# Patient Record
Sex: MALE | Race: BLACK OR AFRICAN AMERICAN | NOT HISPANIC OR LATINO | URBAN - METROPOLITAN AREA
[De-identification: names, ages, dates, MRNs, and addresses within clinical notes are randomized per-mention and may not be internally consistent; named-entity substitution may affect disease eponyms.]

---

## 2024-11-19 ENCOUNTER — TELEPHONE (OUTPATIENT)
Dept: GASTROENTEROLOGY | Facility: CLINIC | Age: 69
End: 2024-11-19
Payer: MEDICARE

## 2024-11-19 NOTE — TELEPHONE ENCOUNTER
Left message for patient to call office to schedule colonoscopy no office needed No active Portal no message sent.

## 2024-11-19 NOTE — TELEPHONE ENCOUNTER
----- Message from Kimberlee Boss NP sent at 11/19/2024 12:07 PM CST -----  Regarding: positive cologuard  If he wants to just schedule his colonoscopy - I don't need to see him

## 2024-11-19 NOTE — TELEPHONE ENCOUNTER
----- Message from Celia sent at 11/19/2024  3:36 PM CST -----  Contact: self  Type:  Patient Returning Call    Who Called:self  Who Left Message for Patient:rhonda   Does the patient know what this is regarding?:yes  Would the patient rather a call back or a response via [facility]? call  Best Call Back Number:.[phone]    Additional Information: please advise and thank you.

## 2024-11-20 ENCOUNTER — TELEPHONE (OUTPATIENT)
Dept: GASTROENTEROLOGY | Facility: CLINIC | Age: 69
End: 2024-11-20
Payer: MEDICARE

## 2024-11-20 DIAGNOSIS — Z12.11 SCREENING FOR COLON CANCER: ICD-10-CM

## 2024-11-20 DIAGNOSIS — R19.5 POSITIVE COLORECTAL CANCER SCREENING USING COLOGUARD TEST: Primary | ICD-10-CM

## 2024-11-20 NOTE — TELEPHONE ENCOUNTER
Colonoscopy 12/23 instructions reviewed and patient states understanding. Patient will come by the office today and  a copy of prep instructions. Patient states he had a +cologuard that was sent by his insurance and he is not on any medications.

## 2024-12-23 ENCOUNTER — HOSPITAL ENCOUNTER (OUTPATIENT)
Facility: HOSPITAL | Age: 69
Discharge: HOME | End: 2024-12-23
Attending: STUDENT IN AN ORGANIZED HEALTH CARE EDUCATION/TRAINING PROGRAM | Admitting: STUDENT IN AN ORGANIZED HEALTH CARE EDUCATION/TRAINING PROGRAM
Payer: MEDICARE

## 2024-12-23 ENCOUNTER — ANESTHESIA EVENT (OUTPATIENT)
Dept: ENDOSCOPY | Facility: HOSPITAL | Age: 69
End: 2024-12-23
Payer: MEDICARE

## 2024-12-23 ENCOUNTER — ANESTHESIA (OUTPATIENT)
Dept: ENDOSCOPY | Facility: HOSPITAL | Age: 69
End: 2024-12-23
Payer: MEDICARE

## 2024-12-23 DIAGNOSIS — R19.5 POSITIVE COLORECTAL CANCER SCREENING USING COLOGUARD TEST: Primary | ICD-10-CM

## 2024-12-23 PROCEDURE — 37000008 HC ANESTHESIA 1ST 15 MINUTES: Performed by: STUDENT IN AN ORGANIZED HEALTH CARE EDUCATION/TRAINING PROGRAM

## 2024-12-23 PROCEDURE — 88305 TISSUE EXAM BY PATHOLOGIST: CPT | Mod: TC | Performed by: PATHOLOGY

## 2024-12-23 PROCEDURE — D9220A PRA ANESTHESIA: Mod: PT,CRNA,, | Performed by: STUDENT IN AN ORGANIZED HEALTH CARE EDUCATION/TRAINING PROGRAM

## 2024-12-23 PROCEDURE — 45380 COLONOSCOPY AND BIOPSY: CPT | Mod: PT,KX,, | Performed by: STUDENT IN AN ORGANIZED HEALTH CARE EDUCATION/TRAINING PROGRAM

## 2024-12-23 PROCEDURE — 45380 COLONOSCOPY AND BIOPSY: CPT | Mod: PT,KX | Performed by: STUDENT IN AN ORGANIZED HEALTH CARE EDUCATION/TRAINING PROGRAM

## 2024-12-23 PROCEDURE — 27201012 HC FORCEPS, HOT/COLD, DISP: Performed by: STUDENT IN AN ORGANIZED HEALTH CARE EDUCATION/TRAINING PROGRAM

## 2024-12-23 PROCEDURE — 37000009 HC ANESTHESIA EA ADD 15 MINS: Performed by: STUDENT IN AN ORGANIZED HEALTH CARE EDUCATION/TRAINING PROGRAM

## 2024-12-23 PROCEDURE — 63600175 PHARM REV CODE 636 W HCPCS: Performed by: STUDENT IN AN ORGANIZED HEALTH CARE EDUCATION/TRAINING PROGRAM

## 2024-12-23 PROCEDURE — D9220A PRA ANESTHESIA: Mod: PT,ANES,, | Performed by: ANESTHESIOLOGY

## 2024-12-23 PROCEDURE — 25000003 PHARM REV CODE 250: Performed by: STUDENT IN AN ORGANIZED HEALTH CARE EDUCATION/TRAINING PROGRAM

## 2024-12-23 RX ORDER — LIDOCAINE HYDROCHLORIDE 20 MG/ML
INJECTION INTRAVENOUS
Status: DISCONTINUED | OUTPATIENT
Start: 2024-12-23 | End: 2024-12-23

## 2024-12-23 RX ORDER — SODIUM CHLORIDE 9 MG/ML
INJECTION, SOLUTION INTRAVENOUS CONTINUOUS
Status: DISCONTINUED | OUTPATIENT
Start: 2024-12-23 | End: 2024-12-23 | Stop reason: HOSPADM

## 2024-12-23 RX ORDER — PROPOFOL 10 MG/ML
VIAL (ML) INTRAVENOUS
Status: DISCONTINUED | OUTPATIENT
Start: 2024-12-23 | End: 2024-12-23

## 2024-12-23 RX ADMIN — PROPOFOL 50 MG: 10 INJECTION, EMULSION INTRAVENOUS at 12:12

## 2024-12-23 RX ADMIN — PROPOFOL 20 MG: 10 INJECTION, EMULSION INTRAVENOUS at 12:12

## 2024-12-23 RX ADMIN — PROPOFOL 20 MG: 10 INJECTION, EMULSION INTRAVENOUS at 11:12

## 2024-12-23 RX ADMIN — LIDOCAINE HYDROCHLORIDE 80 MG: 20 INJECTION, SOLUTION INTRAVENOUS at 11:12

## 2024-12-23 RX ADMIN — PROPOFOL 100 MG: 10 INJECTION, EMULSION INTRAVENOUS at 11:12

## 2024-12-23 RX ADMIN — GLYCOPYRROLATE 0.1 MG: 0.2 INJECTION, SOLUTION INTRAMUSCULAR; INTRAVITREAL at 11:12

## 2024-12-23 RX ADMIN — SODIUM CHLORIDE: 9 INJECTION, SOLUTION INTRAVENOUS at 10:12

## 2024-12-23 RX ADMIN — PROPOFOL 30 MG: 10 INJECTION, EMULSION INTRAVENOUS at 11:12

## 2024-12-23 RX ADMIN — PROPOFOL 50 MG: 10 INJECTION, EMULSION INTRAVENOUS at 11:12

## 2024-12-23 NOTE — ANESTHESIA PREPROCEDURE EVALUATION
12/23/2024  Ju Barraza is a 69 y.o., male.      Pre-op Assessment    I have reviewed the NPO Status.   I have reviewed the Medications.     Review of Systems  Anesthesia Hx:  No problems with previous Anesthesia                Cardiovascular:  Exercise tolerance: good                                             Pulmonary:  Pulmonary Normal                       Hepatic/GI:  Bowel Prep.                       Physical Exam  General: Well nourished    Airway:  Mallampati: II   Mouth Opening: Normal  TM Distance: Normal  Tongue: Normal  Neck ROM: Normal ROM    Dental:  Intact    Chest/Lungs:  Clear to auscultation, Normal Respiratory Rate        Anesthesia Plan  Type of Anesthesia, risks & benefits discussed:    Anesthesia Type: Gen Natural Airway  Intra-op Monitoring Plan: Standard ASA Monitors  Induction:  IV  Informed Consent: Informed consent signed with the Patient and all parties understand the risks and agree with anesthesia plan.  All questions answered.   ASA Score: 2    Ready For Surgery From Anesthesia Perspective.     .

## 2024-12-23 NOTE — PLAN OF CARE
Vss, rome po fluids, denies pain, ambulates easily. IV removed, catheter intact. Discharge instructions provided and states understanding. States ready to go home.  Discharged from facility with family per wheelchair.

## 2024-12-23 NOTE — PROVATION PATIENT INSTRUCTIONS
Discharge Summary/Instructions after an Endoscopic Procedure  Patient Name: Ju Barraza  Patient MRN: [id number]  Patient YOB: 1955 Monday, December 23, 2024  Loco Orr DO  Dear patient,  As a result of recent federal legislation (The Federal Cures Act), you may   receive lab or pathology results from your procedure in your [facility]   account before your physician is able to contact you. Your physician or   their representative will relay the results to you with their   recommendations at their soonest availability.  Thank you,  RESTRICTIONS:  During your procedure today, you received medications for sedation.  These   medications may affect your judgment, balance and coordination.  Therefore,   for 24 hours, you have the following restrictions:   - DO NOT drive a car, operate machinery, make legal/financial decisions,   sign important papers or drink alcohol.    ACTIVITY:  Today: no heavy lifting, straining or running due to procedural   sedation/anesthesia.  The following day: return to full activity including work.  DIET:  Eat and drink normally unless instructed otherwise.     TREATMENT FOR COMMON SIDE EFFECTS:  - Mild abdominal pain, nausea, belching, bloating or excessive gas:  rest,   eat lightly and use a heating pad.  - Sore Throat: treat with throat lozenges and/or gargle with warm salt   water.  - Because air was used during the procedure, expelling large amounts of air   from your rectum or belching is normal.  - If a bowel prep was taken, you may not have a bowel movement for 1-3 days.    This is normal.  SYMPTOMS TO WATCH FOR AND REPORT TO YOUR PHYSICIAN:  1. Abdominal pain or bloating, other than gas cramps.  2. Chest pain.  3. Back pain.  4. Signs of infection such as: chills or fever occurring within 24 hours   after the procedure.  5. Rectal bleeding, which would show as bright red, maroon, or black stools.   (A tablespoon of blood from the rectum is not serious,  especially if   hemorrhoids are present.)  6. Vomiting.  7. Weakness or dizziness.  GO DIRECTLY TO THE NEAREST EMERGENCY ROOM IF YOU HAVE ANY OF THE FOLLOWING:      Difficulty breathing              Chills and/or fever over 101 F   Persistent vomiting and/or vomiting blood   Severe abdominal pain   Severe chest pain   Black, tarry stools   Bleeding- more than one tablespoon   Any other symptom or condition that you feel may need urgent attention  Your doctor recommends these additional instructions:  If any biopsies were taken, your doctors clinic will contact you in 1 to 2   weeks with any results.  - Patient has a contact number available for emergencies.  The signs and   symptoms of potential delayed complications were discussed with the   patient.  Return to normal activities tomorrow.  Written discharge   instructions were provided to the patient.   - High fiber diet.   - Continue present medications.   - Await pathology results.   - Plan for repeat colonoscopy in 1 year from surgery date.   - Return to GI clinic in 1 year or sooner if needed.   - Refer to a colo-rectal surgeon at appointment to be scheduled.   - Discharge patient to home (ambulatory).  For questions, problems or results please call your physician - Loco Orr DO at Work:  [phone].  [facility], EMERGENCY ROOM PHONE NUMBER: [phone]  IF A COMPLICATION OR EMERGENCY SITUATION ARISES AND YOU ARE UNABLE TO REACH   YOUR PHYSICIAN - GO DIRECTLY TO THE EMERGENCY ROOM.  Loco Orr DO  12/23/2024 12:36:01 PM  This report has been verified and signed electronically.  Dear patient,  As a result of recent federal legislation (The Federal Cures Act), you may   receive lab or pathology results from your procedure in your [facility]   account before your physician is able to contact you. Your physician or   their representative will relay the results to you with their   recommendations at their soonest availability.  Thank  you,  PROVATION

## 2024-12-23 NOTE — H&P
History & Physical - Short Stay  Gastroenterology      SUBJECTIVE:     Procedure: Colonoscopy    Chief Complaint/Indication for Procedure: +Cologuard    No medications prior to admission.       Review of patient's allergies indicates:  No Known Allergies     History reviewed. No pertinent past medical history.  History reviewed. No pertinent surgical history.  No family history on file.  Social History     Tobacco Use    Smoking status: Never    Smokeless tobacco: Never   Substance Use Topics    Alcohol use: Never    Drug use: Never         OBJECTIVE:     Vital Signs (Most Recent)  Temp: 97.7 °F (36.5 °C) (12/23/24 0945)  Pulse: 94 (12/23/24 0945)  Resp: 20 (12/23/24 0945)  BP: (!) 157/85 (12/23/24 0945)  SpO2: 96 % (12/23/24 0945)    Physical Exam:                                                       GENERAL:  Comfortable, in no acute distress.                                 HEENT EXAM:  Nonicteric.  No adenopathy.  Oropharynx is clear.               NECK:  Supple.                                                               LUNGS:  Clear.                                                               CARDIAC:  Regular rate and rhythm.  S1, S2.  No murmur.                      ABDOMEN:  Soft, positive bowel sounds, nontender.  No hepatosplenomegaly or masses.  No rebound or guarding.                                             EXTREMITIES:  No edema.     MENTAL STATUS:  Normal, alert and oriented.      ASSESSMENT/PLAN:     Assessment: +Cologuard    Plan: Colonoscopy    Anesthesia Plan: MAC    ASA Grade: ASA 2 - Patient with mild systemic disease with no functional limitations    MALLAMPATI SCORE:  I (soft palate, uvula, fauces, and tonsillar pillars visible)

## 2024-12-23 NOTE — TRANSFER OF CARE
"Anesthesia Transfer of Care Note    Patient: Ju Barraza    Procedure(s) Performed: Procedure(s) (LRB):  COLONOSCOPY, SCREENING, HIGH RISK PATIENT (N/A)    Patient location: GI    Anesthesia Type: general    Transport from OR: Transported from OR on room air with adequate spontaneous ventilation    Post pain: adequate analgesia    Post assessment: no apparent anesthetic complications    Post vital signs: stable    Level of consciousness: lethargic and responds to stimulation    Nausea/Vomiting: no nausea/vomiting    Complications: none    Transfer of care protocol was followed      Last vitals: Visit Vitals  BP (!) 157/85 (BP Location: Left arm, Patient Position: Lying)   Pulse 94   Temp 36.5 °C (97.7 °F) (Skin)   Resp 20   Ht 5' 8.5" (1.74 m)   Wt 93 kg (205 lb)   SpO2 96%   BMI 30.72 kg/m²     " Addended by: ANDRE RAGSDALE on: 10/25/2021 01:17 PM     Modules accepted: Orders

## 2024-12-24 ENCOUNTER — TELEPHONE (OUTPATIENT)
Dept: SURGERY | Facility: CLINIC | Age: 69
End: 2024-12-24
Payer: MEDICARE

## 2024-12-24 VITALS
OXYGEN SATURATION: 83 % | HEART RATE: 80 BPM | HEIGHT: 69 IN | BODY MASS INDEX: 30.36 KG/M2 | SYSTOLIC BLOOD PRESSURE: 137 MMHG | RESPIRATION RATE: 18 BRPM | TEMPERATURE: 98 F | DIASTOLIC BLOOD PRESSURE: 84 MMHG | WEIGHT: 205 LBS

## 2024-12-24 DIAGNOSIS — R19.5 POSITIVE COLORECTAL CANCER SCREENING USING COLOGUARD TEST: Primary | ICD-10-CM

## 2024-12-24 DIAGNOSIS — K63.89 COLONIC MASS: Primary | ICD-10-CM

## 2024-12-26 ENCOUNTER — TELEPHONE (OUTPATIENT)
Dept: SURGERY | Facility: CLINIC | Age: 69
End: 2024-12-26
Payer: MEDICARE

## 2024-12-26 NOTE — TELEPHONE ENCOUNTER
----- Message from Nurse Pierre sent at 12/24/2024 12:30 PM CST -----    ----- Message -----  From: Domi Brian  Sent: 12/24/2024  12:20 PM CST  To: Dominga Adan Staff    Type:  Patient Returning Call    Who Called:  Ju  Who Left Message for Patient:  not sure  Does the patient know what this is regarding?:  no  Best Call Back Number:  [phone]   Additional Information:  PT rtn a call back but not sure who called

## 2024-12-26 NOTE — ANESTHESIA POSTPROCEDURE EVALUATION
Anesthesia Post Evaluation    Patient: Ju Barraza    Procedure(s) Performed: Procedure(s) (LRB):  COLONOSCOPY, SCREENING, HIGH RISK PATIENT (N/A)    Final Anesthesia Type: general      Patient location during evaluation: PACU  Patient participation: Yes- Able to Participate  Level of consciousness: awake and alert and oriented  Post-procedure vital signs: reviewed and stable  Pain management: adequate  Airway patency: patent    PONV status at discharge: No PONV  Anesthetic complications: no      Cardiovascular status: blood pressure returned to baseline  Respiratory status: unassisted, spontaneous ventilation and room air  Hydration status: euvolemic  Follow-up not needed.              Vitals Value Taken Time   /84 12/23/24 1245   Temp 36.4 °C (97.5 °F) 12/23/24 1245   Pulse 80 12/23/24 1245   Resp 18 12/23/24 1245   SpO2 83 % 12/23/24 1245         Event Time   Out of Recovery 12:59:34         Pain/Liban Score: No data recorded

## 2024-12-30 ENCOUNTER — HOSPITAL ENCOUNTER (OUTPATIENT)
Dept: RADIOLOGY | Facility: HOSPITAL | Age: 69
Discharge: HOME | End: 2024-12-30
Attending: STUDENT IN AN ORGANIZED HEALTH CARE EDUCATION/TRAINING PROGRAM
Payer: MEDICARE

## 2024-12-30 DIAGNOSIS — K63.89 COLONIC MASS: ICD-10-CM

## 2024-12-30 PROCEDURE — 25500020 PHARM REV CODE 255

## 2024-12-30 PROCEDURE — 71260 CT THORAX DX C+: CPT | Mod: 26,,, | Performed by: RADIOLOGY

## 2024-12-30 PROCEDURE — 74177 CT ABD & PELVIS W/CONTRAST: CPT | Mod: 26,,, | Performed by: RADIOLOGY

## 2024-12-30 PROCEDURE — 71260 CT THORAX DX C+: CPT | Mod: TC

## 2024-12-30 RX ADMIN — IOHEXOL 100 ML: 350 INJECTION, SOLUTION INTRAVENOUS at 01:12

## 2025-01-02 DIAGNOSIS — K63.89 COLONIC MASS: Primary | ICD-10-CM

## 2025-01-03 ENCOUNTER — TELEPHONE (OUTPATIENT)
Dept: HEMATOLOGY/ONCOLOGY | Facility: CLINIC | Age: 70
End: 2025-01-03
Payer: MEDICARE

## 2025-01-03 NOTE — TELEPHONE ENCOUNTER
----- Message from Appetas sent at 1/3/2025 12:25 PM CST -----  Type: Needs Medical Advice  Who Called:  Ju   Best Call Back Number: [phone]    Additional Information: Ju  has a referral for hematology and oncology and is ready to schedule , please call to further discuss thank you .

## 2025-01-03 NOTE — NURSING
Spoke with patient, wants to schedule with [facility] from referral.  Explained that he is scheduled with Dr. Fuentes for a bx and once we get the pathology back we will schedule. We can not schedule until we know what type of cancer he has. Pt verbalized agreement. Reminder set to watch for Path results.

## 2025-01-06 ENCOUNTER — TELEPHONE (OUTPATIENT)
Dept: SURGERY | Facility: CLINIC | Age: 70
End: 2025-01-06
Payer: MEDICARE

## 2025-01-06 ENCOUNTER — TELEPHONE (OUTPATIENT)
Dept: BARIATRICS | Facility: CLINIC | Age: 70
End: 2025-01-06
Payer: MEDICARE

## 2025-01-06 NOTE — TELEPHONE ENCOUNTER
Patient was rescheduled from 1:30pm tomorrow to 3:45pm with Dr. Fuentes in [place] by CURTIS Nicole

## 2025-01-07 ENCOUNTER — OFFICE VISIT (OUTPATIENT)
Dept: SURGERY | Facility: CLINIC | Age: 70
End: 2025-01-07
Payer: MEDICARE

## 2025-01-07 VITALS
SYSTOLIC BLOOD PRESSURE: 159 MMHG | BODY MASS INDEX: 32.26 KG/M2 | RESPIRATION RATE: 16 BRPM | WEIGHT: 217.81 LBS | HEIGHT: 69 IN | TEMPERATURE: 99 F | DIASTOLIC BLOOD PRESSURE: 77 MMHG | HEART RATE: 97 BPM

## 2025-01-07 DIAGNOSIS — K63.89 COLONIC MASS: Primary | ICD-10-CM

## 2025-01-07 DIAGNOSIS — R19.5 POSITIVE COLORECTAL CANCER SCREENING USING COLOGUARD TEST: ICD-10-CM

## 2025-01-07 DIAGNOSIS — Z01.818 PREOP EXAMINATION: ICD-10-CM

## 2025-01-07 DIAGNOSIS — R94.8 ABNORMAL RESULTS OF FUNCTION STUDIES OF OTHER ORGANS AND SYSTEMS: ICD-10-CM

## 2025-01-07 DIAGNOSIS — D37.4 NEOPLASM OF UNCERTAIN BEHAVIOR OF COLON: ICD-10-CM

## 2025-01-07 PROCEDURE — 99999 PR PBB SHADOW E&M-EST. PATIENT-LVL IV: CPT | Mod: PBBFAC,,, | Performed by: SURGERY

## 2025-01-07 PROCEDURE — 99214 OFFICE O/P EST MOD 30 MIN: CPT | Mod: PBBFAC,PN | Performed by: SURGERY

## 2025-01-07 PROCEDURE — 99205 OFFICE O/P NEW HI 60 MIN: CPT | Mod: S$PBB,,, | Performed by: SURGERY

## 2025-01-07 NOTE — PATIENT INSTRUCTIONS
Surgery is scheduled for 1/17/25 arrival time will be given by the the preop nurse.  You may receive two calls from the Preop Nurses one a few days before surgery the second the day before surgery with the arrival time.  The preop nurse will call you from [phone]  Nothing to eat or drink after midnight.  Someone to drive you home if you are same day surgery.    THE PREOP NURSE WILL CALL, SOMETIMES AS LATE AS 4 or 5 PM IN THE AFTERNOON THE DAY BEFORE SURGERY.    Shower the night before and the morning of your procedure with Chlorhexidine Surgical Scrub also known as Hibiclens it can be purchased at most Pharmacy's no prescription needed.    Special Instruction:     Your procedure/surgery is scheduled at the [facility] at [address] [place].     Bowel Prep for Colonoscopy/Surgery  Purchase:  Dulcolax Pills (Laxative) 4 tablets  14 dose bottle of Miralax Powder  64 ounces of Gatorade or Powerade    The day before your procedure no solid foods, clear liquids only to include water, Gatorade,Powerade.  Coffee no creamer  Soft Drinks  Popsicles   Jello   Sachin Aid  Chicken or beef broth  Apple juice, white grape juice, Tea,   Hard Candy    NO RED OR PURPLE COLORED LIQUIDS, JELLO, POPSCILES.  Try to Avoid these foods 7 days before your Procedure:  beans, peas, corn, nuts, popcorn and tomatoes.  Try not to use Advil or Ibuprofen, Non-Steroidal Anti-inflammatories such as Aleve for 7 days before your colonoscopy, no fish oil for 5 days before the procedure.    Mix the entire 14 dose bottle of Miralax into  64 ounces of Gatorade into a large pitcher, stir and chill until ready to use.  The day before procedure starting at noon take all 4 Dulcolax tablets followed by clear liquids until 2pm.  Keep in mind to drink plenty of fluids this is how the laxatives work, plenty fluids.  After 2pm no later than 4pm start drinkg the Miralax/Gatorade mix 8  ounces at a time over a 2-4 hour period until completing the entire 64 ounces, one glass every 15 to 30 minutes.  Keep sipping clear liquids between each dose  Contact your Diabetes doctor or Endocrinologist for detailed instruction regarding your insulin or oral diabetes medication.     Contact Dr. Fuentes if you have any questions, [phone]    Be Sure to Notify Your Doctor if You are on a Prescription Blood Thinner.    Contact Donato Toney LPN for questions are concerns. [phone]

## 2025-01-08 NOTE — PROGRESS NOTES
Subjective     Patient ID: Ju Barraza is a 69 y.o. male.    Chief Complaint: Consult (Cecal mass)    HPI  this is a pleasant 69 year gentleman who was referred to me for evaluation following recent colonoscopy.  Patient had recent colonoscopy given positive Cologuard test.  At colonoscopy he was found to have a large lesion in the cecum.  Mass was too large to be endoscopically removed.  It certainly had adenomatous features with concern for potential malignancy.  Biopsies were performed demonstrating fragments of tubular adenoma with high-grade dysplasia.  Patient later had a CT scan of the chest abdomen and pelvis demonstrating mass within the cecum.  Incidentally there were also 2 enlarged nodes along the right external iliac chain.  One measured 3.7 x 3 x 3.9 cm in the other measured 1.3 x 1.1 x 1 cm in was adjacent to the bladder on the right side of note his prostate gland was enlarged in conjunction with the enlarged retroperitoneal lymph nodes.  He has no significant medical or surgical history  Review of Systems   Constitutional:  Negative for activity change and appetite change.   Gastrointestinal:  Negative for abdominal distention, abdominal pain and anal bleeding.          Objective     Physical Exam  Vitals reviewed.   Cardiovascular:      Rate and Rhythm: Normal rate.      Pulses: Normal pulses.   Pulmonary:      Effort: Pulmonary effort is normal.   Abdominal:      General: Abdomen is flat.   Skin:     General: Skin is warm.   Neurological:      General: No focal deficit present.      Mental Status: He is alert.   Psychiatric:         Mood and Affect: Mood normal.       Please refer to endoscopy report from 12/23 for picks of the lesion.      Pathology:    CECUM, MASS, BIOPSY:     --FRAGMENTS OF TUBULAR ADENOMA WITH HIGH GRADE DYSPLASIA._______________________________________________________________________________________________________       CT chest/abd/pelvis reviewed.  Discussed in  HPI       Assessment and Plan     1. Positive colorectal cancer screening using Cologuard test  -     Ambulatory referral/consult to Colorectal Surgery ([place])        Lengthy discussion with the patient and his family.  Patient does have malignant appearing colon mass in the cecum.  This will certainly require a right hemicolectomy.  Incidentally found to have 2 large retroperitoneal lymph nodes at the time of CT scan.  This may be metastatic disease or certainly given the enlarged prostate seen on CT scan may be related to the prostate.  We will check a PSA.  If the PSA is normal would plan on excising these nodes robotically at the time of surgery.  If PSA is abnormal may require urologic workup prior to proceeding with surgical intervention.  We will check CEA and PSA level in the next 24-48 hours and make further recommendations based on these results.         No follow-ups on file.

## 2025-01-09 RX ORDER — METRONIDAZOLE 500 MG/100ML
500 INJECTION, SOLUTION INTRAVENOUS
OUTPATIENT
Start: 2025-01-09

## 2025-01-09 RX ORDER — SODIUM CHLORIDE 9 MG/ML
INJECTION, SOLUTION INTRAVENOUS CONTINUOUS
OUTPATIENT
Start: 2025-01-09

## 2025-01-10 ENCOUNTER — TELEPHONE (OUTPATIENT)
Dept: SURGERY | Facility: CLINIC | Age: 70
End: 2025-01-10
Payer: MEDICARE

## 2025-01-10 NOTE — TELEPHONE ENCOUNTER
I called patient to inform him that Donato has gone for the day.  I do not see anything about an antibiotic prescribed.  He asked about the Pre-admit appointment on Tuesday, 1/14/25 @ 8:15am @ [facility].  I informed him that they'll give him the prep and he'll have lab drawn.  Patient is having surgery on Friday, 1/17/25.  I'll forward the message to Donato for Monday.  Parviz

## 2025-01-10 NOTE — TELEPHONE ENCOUNTER
----- Message from Ellie sent at 1/10/2025  2:42 PM CST -----  Type:  Needs Medical Advice    Who Called: pt    Pharmacy name and phone #:    [id number] - [place] [id number] AT [address] [facility]  [address] [address]  Phone: [phone] Fax: [phone]       Would the patient rather a call back or a response via [facility]? Call back/ portal    Best Call Back Number: [phone]    Additional Information: Pt was under the impression that he was supposed to start antibiotics before his surgery a staff member, maybe Donato was going over the procedure and asked him what pharmacy etc he used. Pt just left [facility] and he doesn't have an Rx there for antibiotics. Please call to verify.  Please call back to advise. Thanks!

## 2025-01-13 DIAGNOSIS — C18.9 MALIGNANT NEOPLASM OF COLON, UNSPECIFIED PART OF COLON: Primary | ICD-10-CM

## 2025-01-13 RX ORDER — NEOMYCIN SULFATE 500 MG/1
TABLET ORAL
Qty: 6 TABLET | Refills: 0 | Status: SHIPPED | OUTPATIENT
Start: 2025-01-13

## 2025-01-14 ENCOUNTER — HOSPITAL ENCOUNTER (OUTPATIENT)
Dept: PREADMISSION TESTING | Facility: HOSPITAL | Age: 70
Discharge: HOME | End: 2025-01-14
Attending: SURGERY
Payer: MEDICARE

## 2025-01-14 VITALS — HEIGHT: 69 IN | WEIGHT: 217 LBS | BODY MASS INDEX: 32.14 KG/M2

## 2025-01-14 DIAGNOSIS — Z01.818 PREOP EXAMINATION: ICD-10-CM

## 2025-01-14 DIAGNOSIS — R19.5 POSITIVE COLORECTAL CANCER SCREENING USING COLOGUARD TEST: ICD-10-CM

## 2025-01-14 LAB
ABO + RH BLD: NORMAL
ALBUMIN SERPL BCP-MCNC: 3.5 G/DL (ref 3.5–5.2)
ALP SERPL-CCNC: 66 U/L (ref 40–150)
ALT SERPL W/O P-5'-P-CCNC: 22 U/L (ref 10–44)
ANION GAP SERPL CALC-SCNC: 12 MMOL/L (ref 8–16)
AST SERPL-CCNC: 14 U/L (ref 10–40)
BASOPHILS # BLD AUTO: 0.04 K/UL (ref 0–0.2)
BASOPHILS NFR BLD: 0.7 % (ref 0–1.9)
BILIRUB SERPL-MCNC: 0.3 MG/DL (ref 0.1–1)
BLD GP AB SCN CELLS X3 SERPL QL: NORMAL
BUN SERPL-MCNC: 16 MG/DL (ref 8–23)
CALCIUM SERPL-MCNC: 8.6 MG/DL (ref 8.7–10.5)
CHLORIDE SERPL-SCNC: 108 MMOL/L (ref 95–110)
CO2 SERPL-SCNC: 22 MMOL/L (ref 23–29)
CREAT SERPL-MCNC: 0.9 MG/DL (ref 0.5–1.4)
DIFFERENTIAL METHOD BLD: ABNORMAL
EOSINOPHIL # BLD AUTO: 0.2 K/UL (ref 0–0.5)
EOSINOPHIL NFR BLD: 2.5 % (ref 0–8)
ERYTHROCYTE [DISTWIDTH] IN BLOOD BY AUTOMATED COUNT: 16.3 % (ref 11.5–14.5)
EST. GFR  (NO RACE VARIABLE): >60 ML/MIN/1.73 M^2
GLUCOSE SERPL-MCNC: 133 MG/DL (ref 70–110)
HCT VFR BLD AUTO: 42.2 % (ref 40–54)
HGB BLD-MCNC: 12.6 G/DL (ref 14–18)
IMM GRANULOCYTES # BLD AUTO: 0.03 K/UL (ref 0–0.04)
IMM GRANULOCYTES NFR BLD AUTO: 0.5 % (ref 0–0.5)
LYMPHOCYTES # BLD AUTO: 1.5 K/UL (ref 1–4.8)
LYMPHOCYTES NFR BLD: 24.1 % (ref 18–48)
MCH RBC QN AUTO: 23 PG (ref 27–31)
MCHC RBC AUTO-ENTMCNC: 29.9 G/DL (ref 32–36)
MCV RBC AUTO: 77 FL (ref 82–98)
MONOCYTES # BLD AUTO: 0.8 K/UL (ref 0.3–1)
MONOCYTES NFR BLD: 12.4 % (ref 4–15)
NEUTROPHILS # BLD AUTO: 3.6 K/UL (ref 1.8–7.7)
NEUTROPHILS NFR BLD: 59.8 % (ref 38–73)
NRBC BLD-RTO: 0 /100 WBC
PLATELET # BLD AUTO: 222 K/UL (ref 150–450)
PMV BLD AUTO: 9.9 FL (ref 9.2–12.9)
POTASSIUM SERPL-SCNC: 4 MMOL/L (ref 3.5–5.1)
PROT SERPL-MCNC: 7.1 G/DL (ref 6–8.4)
RBC # BLD AUTO: 5.47 M/UL (ref 4.6–6.2)
SODIUM SERPL-SCNC: 142 MMOL/L (ref 136–145)
SPECIMEN OUTDATE: NORMAL
WBC # BLD AUTO: 6.05 K/UL (ref 3.9–12.7)

## 2025-01-14 PROCEDURE — 93010 ELECTROCARDIOGRAM REPORT: CPT | Mod: ,,, | Performed by: INTERNAL MEDICINE

## 2025-01-14 PROCEDURE — 80053 COMPREHEN METABOLIC PANEL: CPT | Performed by: SURGERY

## 2025-01-14 PROCEDURE — 85025 COMPLETE CBC W/AUTO DIFF WBC: CPT | Performed by: SURGERY

## 2025-01-14 PROCEDURE — 93005 ELECTROCARDIOGRAM TRACING: CPT

## 2025-01-14 PROCEDURE — 86901 BLOOD TYPING SEROLOGIC RH(D): CPT | Performed by: SURGERY

## 2025-01-14 NOTE — DISCHARGE INSTRUCTIONS
To confirm, Your doctor has instructed you that surgery is scheduled for: 1/17/25 with DR. NAPOLES    Please report to [facility], Registration the morning of surgery. You must check-in and receive a wristband before going to your procedure.  [address] [place] [address] [place] [address]    Pre-Op will call the afternoon prior to surgery between 1:00 and 6:00 PM with the final arrival time.  Phone number: [phone]    PLEASE NOTE:  The surgery schedule has many variables which may affect the time of your surgery case.  Family members should be available if your surgery time changes.  Plan to be here the day of your procedure between 4-6 hours.    MEDICATIONS:  TAKE ONLY THESE MEDICATIONS WITH A SMALL SIP OF WATER THE MORNING OF YOUR PROCEDURE:  SEE MED LIST      DO NOT TAKE THESE MEDICATIONS 5-7 DAYS PRIOR to your procedure or per your surgeon's request:   ASPIRIN, ALEVE, ADVIL, IBUPROFEN, FISH OIL VITAMIN E, HERBALS  (May take Tylenol)    ONLY if you are prescribed any types of blood thinners such as:  Aspirin, Coumadin, Plavix, Pradaxa, Xarelto, Aggrenox, Effient, Eliquis, Savasya, Brilinta, or any other, ask your surgeon whether you should stop taking them and how long before surgery you should stop.  You may also need to verify with the prescribing physician if it is ok to stop your medication.      INSTRUCTIONS IMPORTANT!!  Do not eat or drink anything between midnight and the time of your procedure- this includes gum, mints, and candy.  EXCEPT: you may have clear liquids such as:  WATER, BLACK COFFEE, UNSWEET TEA, OR GATORADE (NO RED OR PURPLE) UP TO 2 HOURS PRIOR TO YOUR ARRIVAL TIME.  Do not smoke or drink alcoholic beverages 24 hours prior to your procedure.  Shower the night before AND the morning of your procedure with a Chlorhexidine wash such as Hibiclens or Dial antibacterial soap from the neck down.  Do not get it on your face or in your eyes.  You may use your own shampoo and face  wash. This helps your skin to be as bacteria free as possible.    If you wear contact lenses, dentures, hearing aids or glasses, bring a container to put them in during surgery and give to a family member for safe keeping.  Please leave all jewelry, piercing's and valuables at home. You must remove your false eyelashes prior to surgery.    DO NOT remove hair from the surgery site.  Do not shave the incision site unless you are given specific instructions to do so.    ONLY if you have been diagnosed with sleep apnea please bring your C-PAP machine.  ONLY if you wear home oxygen please bring your portable oxygen tank the day of your procedure.  ONLY if you have a history of OPEN HEART SURGERY you will need a clearance from your Cardiologist per Anesthesia.      ONLY for patients requiring bowel prep, written instructions will be given by your doctor's office.  ONLY if you have a neuro stimulator, please bring the controller with you the morning of surgery  ONLY if a type and screen test is needed before surgery, please return:  If your doctor has scheduled you for an overnight stay, bring a small overnight bag with any personal items you need.  Make arrangements in advance for transportation home by a responsible adult. You can not go home in an uber or a cab per hospital policy.  It is not safe to drive a vehicle during the 24 hours after anesthesia.          All  facilities and properties are tobacco free.  Smoking is NOT allowed.   If you have any questions about these instructions, call Pre-Op Admit  Nursing at [phone] or the Pre-Op Day Surgery Unit at [phone].

## 2025-01-15 ENCOUNTER — TELEPHONE (OUTPATIENT)
Dept: SURGERY | Facility: CLINIC | Age: 70
End: 2025-01-15
Payer: MEDICARE

## 2025-01-15 DIAGNOSIS — R97.20 ELEVATED PSA: Primary | ICD-10-CM

## 2025-01-15 NOTE — TELEPHONE ENCOUNTER
----- Message from Jackelin sent at 1/15/2025  2:36 PM CST -----  Contact: pt  Type: Needs Medical Advice         Who Called: pt  Best Call Back Number:[phone]  Additional Information: Requesting a call back regarding  pt was informed procedure would be in March now and he is asking if he can go back to work until everything is taken place ?  Please Advise- Thank you

## 2025-01-15 NOTE — TELEPHONE ENCOUNTER
I called patient to inform that Dr. Fuentes said that he can go back to work.  Patient stated that he'll wait till after 1/29/25 to see Dr. Freire in [place] to return to work.  I informed that I have to schedule his MRI Prostate @ [facility] and that it would be best to get it done prior to seeing Dr. Freire.  I informed patient that I'll call him tomorrow to let him know the date and time. Patient understood. Parviz

## 2025-01-15 NOTE — TELEPHONE ENCOUNTER
----- Message from Carlos sent at 1/15/2025 11:19 AM CST -----  Regarding: return call  Contact: patient  Type:  Patient Returning Call    Who Called:patient  Who Left Message for Patient:  Does the patient know what this is regarding?:returning office call  Would the patient rather a call back or a response via [facility]?   Best Call Back Number:[phone]  Additional Information:

## 2025-01-15 NOTE — TELEPHONE ENCOUNTER
I called patient back and informed him that his prescription was sent into the pharmacy.  He stated that he picked the prescription up yesterday.  Parviz

## 2025-01-16 ENCOUNTER — TELEPHONE (OUTPATIENT)
Dept: SURGERY | Facility: CLINIC | Age: 70
End: 2025-01-16
Payer: MEDICARE

## 2025-01-16 NOTE — TELEPHONE ENCOUNTER
I called patient about his MRI Prostate scheduled on Sunday, 1/19/25 @ 4pm @ [facility].  Patient is to check in @ 3:30pm thru the Emergency Room.   Parviz

## 2025-01-19 ENCOUNTER — HOSPITAL ENCOUNTER (OUTPATIENT)
Dept: RADIOLOGY | Facility: HOSPITAL | Age: 70
Discharge: HOME | End: 2025-01-19
Attending: SURGERY
Payer: MEDICARE

## 2025-01-19 DIAGNOSIS — R97.20 ELEVATED PSA: ICD-10-CM

## 2025-01-19 PROCEDURE — 72197 MRI PELVIS W/O & W/DYE: CPT | Mod: TC

## 2025-01-19 PROCEDURE — A9585 GADOBUTROL INJECTION: HCPCS

## 2025-01-19 PROCEDURE — 25500020 PHARM REV CODE 255

## 2025-01-19 PROCEDURE — 72197 MRI PELVIS W/O & W/DYE: CPT | Mod: 26,,, | Performed by: RADIOLOGY

## 2025-01-19 RX ORDER — GADOBUTROL 604.72 MG/ML
INJECTION INTRAVENOUS
Status: COMPLETED
Start: 2025-01-19 | End: 2025-01-19

## 2025-01-19 RX ADMIN — GADOBUTROL 9 ML: 604.72 INJECTION INTRAVENOUS at 04:01

## 2025-01-21 LAB
OHS QRS DURATION: 84 MS
OHS QTC CALCULATION: 465 MS

## 2025-01-29 ENCOUNTER — OFFICE VISIT (OUTPATIENT)
Dept: UROLOGY | Facility: CLINIC | Age: 70
End: 2025-01-29
Payer: MEDICARE

## 2025-01-29 VITALS — HEIGHT: 68 IN | WEIGHT: 204 LBS | BODY MASS INDEX: 30.92 KG/M2

## 2025-01-29 DIAGNOSIS — R97.20 ELEVATED PSA: Primary | ICD-10-CM

## 2025-01-29 PROCEDURE — 99213 OFFICE O/P EST LOW 20 MIN: CPT | Mod: PBBFAC,PO | Performed by: UROLOGY

## 2025-01-29 PROCEDURE — 99204 OFFICE O/P NEW MOD 45 MIN: CPT | Mod: S$PBB,,, | Performed by: UROLOGY

## 2025-01-29 PROCEDURE — G2211 COMPLEX E/M VISIT ADD ON: HCPCS | Mod: S$PBB,,, | Performed by: UROLOGY

## 2025-01-29 PROCEDURE — 99999 PR PBB SHADOW E&M-EST. PATIENT-LVL III: CPT | Mod: PBBFAC,,, | Performed by: UROLOGY

## 2025-01-29 RX ORDER — CIPROFLOXACIN 500 MG/1
500 TABLET ORAL 2 TIMES DAILY
Qty: 6 TABLET | Refills: 0 | Status: SHIPPED | OUTPATIENT
Start: 2025-01-29 | End: 2025-02-01

## 2025-01-29 RX ORDER — GENTAMICIN 40 MG/ML
80 INJECTION, SOLUTION INTRAMUSCULAR; INTRAVENOUS
OUTPATIENT
Start: 2025-01-29

## 2025-01-29 RX ORDER — DIAZEPAM 10 MG/1
10 TABLET ORAL ONCE
Qty: 1 TABLET | Refills: 0 | Status: SHIPPED | OUTPATIENT
Start: 2025-01-29 | End: 2025-01-29

## 2025-01-29 NOTE — H&P (VIEW-ONLY)
[facility] New Patient/H&P:    Ju Barraza is a 69 y.o. male who presents for elevated PSA.    Patient with a history of kidney stones who underwent routine colonoscopy for a positive Cologuard. 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     CT chest abdomen pelvis with contrast on 24 with a 4.6 cm cecal mass highly suspicious for primary colon neoplasm and with enlarged right retroperitoneal lymph nodes consistent with neoplastic nodes. Index nodes detailed above. There is also a enlarged prostate gland and with enlarged retroperitoneal nodes also recommend correlation clinically and with PSA.     Referred to Dr. Fuentes with general surgery for management of his cecal mass. However, his PSA was found to be significantly elevated at 251. Referred to urology for evaluation. Has never had his PSA tested prior.     MRI prostate on 25 with a moderately enlarged 52 cc prostate gland, demonstrating a subcentimeter PI-RADS 4 finding within the left peripheral zone, and diffuse PI-RADS 3 findings throughout the transition zone. Right iliac chain adenopathy concerning for padmini spread of disease. Consider PSMA PET-CT for further evaluation.     He has no significant lower urinary tract symptoms. He has erectile dysfunction. Tried Viagra several years ago, but stopped due to a headache.     Works as a . Brother with a history of pancreatic cancer. Sister  from cancer as well. Never smoked.     Denies any fever, chills, gross hematuria, flank pain, bone pain, unintentional weight loss,  trauma or family history of  malignancy.       PSA  251  25    IPSS QoL  3 1 25      Past Medical History:   Diagnosis Date    Borderline diabetes     Kidney stone     Pre-diabetes        Past Surgical History:   Procedure Laterality Date    COLONOSCOPY, SCREENING, HIGH RISK PATIENT N/A 2024    Procedure: COLONOSCOPY, SCREENING,  "HIGH RISK PATIENT;  Surgeon: Loco Orr DO;  Location: [facility];  Service: Endoscopy;  Laterality: N/A;       Family History   Problem Relation Name Age of Onset    No Known Problems Father      No Known Problems Mother         Review of patient's allergies indicates:  No Known Allergies    Medications Reviewed: see MAR      FOCUSED PHYSICAL EXAM:    There were no vitals filed for this visit.  Body mass index is 31.02 kg/m². Weight: 92.5 kg (204 lb) Height: 5' 8" (172.7 cm)       General: Alert, cooperative, no distress, appears stated age  Abdomen: Soft, non-tender, no CVA tenderness, non-distended  ERIN: Patient declined stating he would like this done during his prostate biopsy      LABS:    No results found for this or any previous visit (from the past 2 weeks).        Assessment/Diagnosis:    1. Elevated PSA  Ambulatory referral/consult to Urology    diazePAM (VALIUM) 10 MG Tab    Procedure Order to Urology          Plans:    - I spent 45 minutes of the day of this encounter preparing for, treating and managing this patient. Visit today included increased complexity associated with the care of the episodic problem addressed and managing the longitudinal care of the patient due to the serious and/or complex managed problem(s) elevated PSA. The natural history of prostate cancer and ongoing controversy regarding screening and potential treatment outcomes of prostate cancer has been discussed with the patient. The meaning of a false positive PSA and a false negative PSA has been discussed. He indicates understanding of the limitations of this screening test.   - Uronav prostate biopsy next week on 2/5/25. Further plan contingent on results.   - Cipro and Valium sent.   "

## 2025-01-29 NOTE — PROGRESS NOTES
Procedure Order to Urology [[id number]]    Electronically signed by: Janeth Freire Jr., MD on 01/29/25 1050 Status: Active   Ordering user: Janeth Freire Jr., MD 01/29/25 1055 Authorized by: Janeth Freire Jr., MD   Ordering mode: Standard   Frequency:  01/29/25 -     Diagnoses  Elevated PSA [R97.20]   Questionnaire    Question Answer   Procedure Prostate Biopsy   Pre-op Diagnosis Elevated prostate specific antigen (PSA)   Facility Name: [place]   Order comments: Uronav prostate biopsy on 2/5/25. Medically urgent.   ASC, please order local sedation, UA poct   preop IM Gentamyacin 80mg,160mg if over 300 lbs (no urine Dr freire or Julienne)

## 2025-01-29 NOTE — PROGRESS NOTES
[facility] New Patient/H&P:    Ju Barraza is a 69 y.o. male who presents for elevated PSA.    Patient with a history of kidney stones who underwent routine colonoscopy for a positive Cologuard. 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     CT chest abdomen pelvis with contrast on 24 with a 4.6 cm cecal mass highly suspicious for primary colon neoplasm and with enlarged right retroperitoneal lymph nodes consistent with neoplastic nodes. Index nodes detailed above. There is also a enlarged prostate gland and with enlarged retroperitoneal nodes also recommend correlation clinically and with PSA.     Referred to Dr. Fuentes with general surgery for management of his cecal mass. However, his PSA was found to be significantly elevated at 251. Referred to urology for evaluation. Has never had his PSA tested prior.     MRI prostate on 25 with a moderately enlarged 52 cc prostate gland, demonstrating a subcentimeter PI-RADS 4 finding within the left peripheral zone, and diffuse PI-RADS 3 findings throughout the transition zone. Right iliac chain adenopathy concerning for padmini spread of disease. Consider PSMA PET-CT for further evaluation.     He has no significant lower urinary tract symptoms. He has erectile dysfunction. Tried Viagra several years ago, but stopped due to a headache.     Works as a . Brother with a history of pancreatic cancer. Sister  from cancer as well. Never smoked.     Denies any fever, chills, gross hematuria, flank pain, bone pain, unintentional weight loss,  trauma or family history of  malignancy.       PSA  251  25    IPSS QoL  3 1 25      Past Medical History:   Diagnosis Date    Borderline diabetes     Kidney stone     Pre-diabetes        Past Surgical History:   Procedure Laterality Date    COLONOSCOPY, SCREENING, HIGH RISK PATIENT N/A 2024    Procedure: COLONOSCOPY, SCREENING,  "HIGH RISK PATIENT;  Surgeon: Loco Orr DO;  Location: [facility];  Service: Endoscopy;  Laterality: N/A;       Family History   Problem Relation Name Age of Onset    No Known Problems Father      No Known Problems Mother         Review of patient's allergies indicates:  No Known Allergies    Medications Reviewed: see MAR      FOCUSED PHYSICAL EXAM:    There were no vitals filed for this visit.  Body mass index is 31.02 kg/m². Weight: 92.5 kg (204 lb) Height: 5' 8" (172.7 cm)       General: Alert, cooperative, no distress, appears stated age  Abdomen: Soft, non-tender, no CVA tenderness, non-distended  ERIN: Patient declined stating he would like this done during his prostate biopsy      LABS:    No results found for this or any previous visit (from the past 2 weeks).        Assessment/Diagnosis:    1. Elevated PSA  Ambulatory referral/consult to Urology    diazePAM (VALIUM) 10 MG Tab    Procedure Order to Urology          Plans:    - I spent 45 minutes of the day of this encounter preparing for, treating and managing this patient. Visit today included increased complexity associated with the care of the episodic problem addressed and managing the longitudinal care of the patient due to the serious and/or complex managed problem(s) elevated PSA. The natural history of prostate cancer and ongoing controversy regarding screening and potential treatment outcomes of prostate cancer has been discussed with the patient. The meaning of a false positive PSA and a false negative PSA has been discussed. He indicates understanding of the limitations of this screening test.   - Uronav prostate biopsy next week on 2/5/25. Further plan contingent on results.   - Cipro and Valium sent.   "

## 2025-01-30 ENCOUNTER — TELEPHONE (OUTPATIENT)
Dept: SURGERY | Facility: CLINIC | Age: 70
End: 2025-01-30
Payer: MEDICARE

## 2025-01-30 NOTE — TELEPHONE ENCOUNTER
Called and attempted to review findings  He saw Urology this week  Will need to arrange surgery for late Feb.

## 2025-02-03 ENCOUNTER — TELEPHONE (OUTPATIENT)
Dept: SURGERY | Facility: CLINIC | Age: 70
End: 2025-02-03
Payer: MEDICARE

## 2025-02-03 ENCOUNTER — TELEPHONE (OUTPATIENT)
Dept: UROLOGY | Facility: CLINIC | Age: 70
End: 2025-02-03
Payer: MEDICARE

## 2025-02-03 RX ORDER — CIPROFLOXACIN 500 MG/1
500 TABLET ORAL 2 TIMES DAILY
COMMUNITY

## 2025-02-03 NOTE — TELEPHONE ENCOUNTER
----- Message from Manuel sent at 2/3/2025  9:30 AM CST -----  Type: Needs Medical Advice    Who Called:  Pt    Best Call Back Number: [phone]    Additional Information: Pt is returning missed call to pedrito to find out more information about medication directions.  Please call back to advise, Thanks!

## 2025-02-03 NOTE — TELEPHONE ENCOUNTER
----- Message from Manuel sent at 2/3/2025  9:36 AM CST -----    ----- Message -----  From: Manuel Barrios  Sent: 2/3/2025   9:31 AM CST  To: Gina JACINTO Staff    Type: Needs Medical Advice    Who Called:  Pt    Best Call Back Number: [phone]    Additional Information: Pt is returning missed call to gina to find out more information about medication directions.  Please call back to advise, Thanks!

## 2025-02-04 ENCOUNTER — DOCUMENTATION ONLY (OUTPATIENT)
Dept: SURGERY | Facility: CLINIC | Age: 70
End: 2025-02-04
Payer: MEDICARE

## 2025-02-04 ENCOUNTER — TELEPHONE (OUTPATIENT)
Dept: SURGERY | Facility: CLINIC | Age: 70
End: 2025-02-04
Payer: MEDICARE

## 2025-02-04 NOTE — PROGRESS NOTES
Surgery is scheduled for 2/21/25 arrival time will be given by the the preop nurse.  You may receive two calls from the Preop Nurses one a few days before surgery the second the day before surgery with the arrival time.  The preop nurse will call you from [phone]  Nothing to eat or drink after midnight.  Someone to drive you home if you are same day surgery.    THE PREOP NURSE WILL CALL, SOMETIMES AS LATE AS 4 or 5 PM IN THE AFTERNOON THE DAY BEFORE SURGERY.    Shower the night before and the morning of your procedure with Chlorhexidine Surgical Scrub also known as Hibiclens it can be purchased at most Pharmacy's no prescription needed.    Special Instruction:     Bowel Prep for Colonoscopy/Surgery  Purchase:  Dulcolax Pills (Laxative) 4 tablets  14 dose bottle of Miralax Powder  64 ounces of Gatorade or Powerade    The day before your procedure no solid foods, clear liquids only to include water, Gatorade,Powerade.  Coffee no creamer  Soft Drinks  Popsicles   Jello   Sachin Aid  Chicken or beef broth  Apple juice, white grape juice, Tea,   Hard Candy    NO RED OR PURPLE COLORED LIQUIDS, JELLO, POPSCILES.  Try to Avoid these foods 7 days before your Procedure:  beans, peas, corn, nuts, popcorn and tomatoes.  Try not to use Advil or Ibuprofen, Non-Steroidal Anti-inflammatories such as Aleve for 7 days before your colonoscopy, no fish oil for 5 days before the procedure.    Mix the entire 14 dose bottle of Miralax into  64 ounces of Gatorade into a large pitcher, stir and chill until ready to use.  The day before procedure starting at noon take all 4 Dulcolax tablets followed by clear liquids until 2pm.  Keep in mind to drink plenty of fluids this is how the laxatives work, plenty fluids.  After 2pm no later than 4pm start drinkg the Miralax/Gatorade mix 8 ounces at a time over a 2-4 hour period until completing the entire 64 ounces, one glass every 15 to 30 minutes.  Keep sipping clear liquids between each  dose  Contact your Diabetes doctor or Endocrinologist for detailed instruction regarding your insulin or oral diabetes medication.     Contact Dr. Fuentes if you have any questions, [phone]    Be Sure to Notify Your Doctor if You are on a Prescription Blood Thinner.         Your procedure/surgery is scheduled at the [facility] at [address] [place].     Contact Donato Toney LPN for questions are concerns. [phone]

## 2025-02-05 ENCOUNTER — HOSPITAL ENCOUNTER (OUTPATIENT)
Facility: HOSPITAL | Age: 70
Discharge: HOME | End: 2025-02-05
Attending: UROLOGY | Admitting: UROLOGY
Payer: MEDICARE

## 2025-02-05 DIAGNOSIS — R97.20 ELEVATED PSA: Primary | ICD-10-CM

## 2025-02-05 PROCEDURE — 76872 US TRANSRECTAL: CPT | Mod: 26,,, | Performed by: UROLOGY

## 2025-02-05 PROCEDURE — 63600175 PHARM REV CODE 636 W HCPCS: Performed by: UROLOGY

## 2025-02-05 PROCEDURE — 55700 HC PROSTATE NEEDLE BIOPSY: CPT | Performed by: UROLOGY

## 2025-02-05 PROCEDURE — 88344 IMHCHEM/IMCYTCHM EA MLT ANTB: CPT | Mod: TC | Performed by: PATHOLOGY

## 2025-02-05 PROCEDURE — 55700 PR BIOPSY OF PROSTATE,NEEDLE/PUNCH: CPT | Mod: ,,, | Performed by: UROLOGY

## 2025-02-05 PROCEDURE — 76872 US TRANSRECTAL: CPT | Performed by: UROLOGY

## 2025-02-05 RX ORDER — LIDOCAINE HYDROCHLORIDE 20 MG/ML
INJECTION, SOLUTION EPIDURAL; INFILTRATION; INTRACAUDAL; PERINEURAL
Status: DISCONTINUED | OUTPATIENT
Start: 2025-02-05 | End: 2025-02-05 | Stop reason: HOSPADM

## 2025-02-05 RX ORDER — GENTAMICIN 40 MG/ML
80 INJECTION, SOLUTION INTRAMUSCULAR; INTRAVENOUS
Status: DISCONTINUED | OUTPATIENT
Start: 2025-02-05 | End: 2025-02-05 | Stop reason: HOSPADM

## 2025-02-05 RX ADMIN — GENTAMICIN SULFATE 80 MG: 40 INJECTION, SOLUTION INTRAMUSCULAR; INTRAVENOUS at 12:02

## 2025-02-05 NOTE — OP NOTE
"[facility] Procedure/Discharge Note  MD: Janeth Freire Jr.     Procedure performed:   TRANSRECTAL PROSTATIC ULTRASOUND  TRANSRECTAL PROSTATE BIOPSY with ultrasound guidance and MRI fusion biopsy      Staff Surgeon: Janeth Freire Jr, MD  Date: 2025  Anesthesia: Local  EBL: Minimal  Complications: None    NAME:Ju Barraza  : 1955  Diagnosis:Elevated PSA    MRI findings:  Results for orders placed or performed during the hospital encounter of 25 (from the past 2160 hours)   MRI Prostate W W/O Contrast    Impression    1. Moderately enlarged prostate gland, demonstrating a subcentimeter PI-RADS 4 finding within the left peripheral zone, and diffuse PI-RADS 3 findings throughout the transition zone.  Right iliac chain adenopathy concerning for padmini spread of disease.  Consider PSMA PET-CT for further evaluation.  Overall Assessment: PI-RADS 4 - High (clinically significant cancer is likely to be present)    Extraprostatic extension: No visible direct extraprostatic invasion.  Right iliac chain lymphadenopathy, including a presumed enlarged lymph node measuring 4 cm.    Number of targets created for potential MR/US fusion biopsy    Peripheral zone: 1    Transition zone: 0    This report was flagged in Epic as abnormal.      Electronically signed by: Cl Henderson MD  Date:    2025  Time:    10:27         Current PSA: No results found for: "PSA", "PSATOTAL", "PSAFREE", "PSAFREEPCT"  Prostate size: 63 cc      Total number of cores taken: 15  Target lesion #1- 3  Right base lateral- 1  Right base medial - 1  Right mid lateral - 1  Right mid medial - 1  Right apex lateral - 1  Right apex medial - 1  Left base lateral - 1  Left base medial - 1  Left mid lateral - 1  Left mid medial - 1  Left apex lateral - 1  Left apex medial - 1      Procedure in Detail:      After informed consent was obtained he was brought to the operating room.  He was given preoperative antibiotics.      He " was placed in the left lateral decubitus position. Digital rectal exam performed and revealed a firm, nodular prostate.  The ultrasound probe was placed in the rectum and dynamic images were obtained of the entire prostate.     A sweep of the prostate was performed from base to apex in the transverse plane using the ultrasound and URONAV platform. Landmarks were then labeled with anterior, posterior, right, left, base and apex were labeled in the axial as well as sagittal planes. Segmentation was then performed and manual adjustments were made as needed starting in the sagittal plane followed by the axial plane. At this point, alignment of the ultrasound with MRI images was ensured using the toggle between ultrasound and MRI.      Each target lesion was sequentially highlighted and biopsies were obtained of each target.      A standard template prostate biopsy was then performed.       The biopsy specimens were fully submerged in formalin labeled with the patient's name and sent for pathological evaluation.      At completion of the procedure I removed the ultrasound probe.  Hemostasis appeared to be adequate.  He tolerated the procedure well and there were no complications.      Discharge home today status post uncomplicated procedure as above  Diet - resume home diet  Follow up: Follow up in 2 to 3 weeks to review pathology.   Instructions: Complete Cipro.   Meds:     Medication List        CONTINUE taking these medications      ciprofloxacin HCl 500 MG tablet  Commonly known as: CIPRO     diazePAM 10 MG Tab  Commonly known as: VALIUM  Take 1 tablet (10 mg total) by mouth once. for 1 dose            ASK your doctor about these medications      * neomycin 500 mg Tab  Commonly known as: MYCIFRADIN  On the day before surgery Take 2 tablets at noon, then 2 tablets at 2pm, then 2 tablets at 10pm.     * neomycin 500 mg Tab  Commonly known as: MYCIFRADIN  The day before surgery, take 2 tablets at noon, then 2 tablets at  2pm, then 2 tablets at 10pm.           * This list has 2 medication(s) that are the same as other medications prescribed for you. Read the directions carefully, and ask your doctor or other care provider to review them with you.                  Janeth Freire Jr, MD

## 2025-02-05 NOTE — PLAN OF CARE
Discharge instructions given to pt/wife, verbalized understanding.  Pt had valium prior to procedure and is very groggy, but responsive.  Pt's wife states ok to take home.  Up in wheelchair waiting for dtr to .

## 2025-02-06 ENCOUNTER — TELEPHONE (OUTPATIENT)
Dept: SURGERY | Facility: CLINIC | Age: 70
End: 2025-02-06
Payer: MEDICARE

## 2025-02-06 ENCOUNTER — TELEPHONE (OUTPATIENT)
Dept: GASTROENTEROLOGY | Facility: CLINIC | Age: 70
End: 2025-02-06
Payer: MEDICARE

## 2025-02-06 VITALS
SYSTOLIC BLOOD PRESSURE: 158 MMHG | WEIGHT: 203.94 LBS | OXYGEN SATURATION: 98 % | DIASTOLIC BLOOD PRESSURE: 80 MMHG | TEMPERATURE: 98 F | HEART RATE: 89 BPM | RESPIRATION RATE: 15 BRPM | HEIGHT: 68 IN | BODY MASS INDEX: 30.91 KG/M2

## 2025-02-06 NOTE — TELEPHONE ENCOUNTER
Spoke to pt, pt stated he was trying to have his message sent to gen surg Dr. Fuentes staff. Pt stated he is needing a work excuse for his wife. Advise pt I will send message to Dr. Fuentes staff.   Pt verbalized understanding.

## 2025-02-06 NOTE — TELEPHONE ENCOUNTER
Spoke to pt, pt stated he was trying to have his message sent to gen surg Dr. Fuentes staff. Pt stated he is needing a work excuse for his wife. Advise pt I will send message to Dr. Fuentes staff.   Pt verbalized understanding.          Note     Melissa Bowman, GUIDON7 minutes ago (2:29 PM)     SS  ----- Message from Moni sent at 2/6/2025 11:33 AM CST -----  Contact: self  Type:  Needs Medical Advice     Who Called: pt     Would the patient rather a call back or a response via [facility]? Call back      Best Call Back Number: [phone]        Additional Information: pt states he needs a excuse for his wife, she has been his care giver taking a lot of days off from work. Needs a form for her to give to work   Please call back to advise. Thanks!

## 2025-02-06 NOTE — TELEPHONE ENCOUNTER
I called and spoke to patient and his wife Taylor.  She needs a note to give to [employer] to be off on Friday, 1/21/25 and other days that she has to take care of her  after his surgery.  I informed them that I'll talk to Dr. Fuentes and get the note signed and I'll let them know when it's ready.  Parviz

## 2025-02-06 NOTE — TELEPHONE ENCOUNTER
----- Message from Moni sent at 2/6/2025 11:33 AM CST -----  Contact: self  Type:  Needs Medical Advice    Who Called: pt    Would the patient rather a call back or a response via [facility]? Call back     Best Call Back Number: [phone]      Additional Information: pt states he needs a excuse for his wife, she has been his care giver taking a lot of days off from work. Needs a form for her to give to work   Please call back to advise. Thanks!

## 2025-02-14 DIAGNOSIS — C61 PROSTATE CANCER: Primary | ICD-10-CM

## 2025-02-20 ENCOUNTER — ANESTHESIA EVENT (OUTPATIENT)
Dept: SURGERY | Facility: HOSPITAL | Age: 70
End: 2025-02-20
Payer: MEDICARE

## 2025-02-20 ENCOUNTER — TELEPHONE (OUTPATIENT)
Dept: SURGERY | Facility: CLINIC | Age: 70
End: 2025-02-20
Payer: MEDICARE

## 2025-02-20 ENCOUNTER — OFFICE VISIT (OUTPATIENT)
Dept: UROLOGY | Facility: CLINIC | Age: 70
End: 2025-02-20
Payer: MEDICARE

## 2025-02-20 VITALS — HEIGHT: 68 IN | WEIGHT: 204 LBS | BODY MASS INDEX: 30.92 KG/M2

## 2025-02-20 DIAGNOSIS — C61 PROSTATE CANCER: Primary | ICD-10-CM

## 2025-02-20 PROCEDURE — 99213 OFFICE O/P EST LOW 20 MIN: CPT | Mod: PBBFAC,PO | Performed by: UROLOGY

## 2025-02-20 RX ORDER — BICALUTAMIDE 50 MG/1
50 TABLET, FILM COATED ORAL DAILY
Qty: 30 TABLET | Refills: 1 | Status: SHIPPED | OUTPATIENT
Start: 2025-02-20 | End: 2025-04-21

## 2025-02-20 NOTE — TELEPHONE ENCOUNTER
----- Message from Cailin sent at 2/19/2025  2:14 PM CST -----  Regarding: preocedure prep and time  Contact: pt  Type:  Needs Medical AdviceWho Called: ptWould the patient rather a call back or a response via [facility]? Call [facility] Call Back Number: pt  [phone] Information: pt asking what time appt for robotic sx on 2/21.  Pt asking what and when he should start taking the prep.

## 2025-02-20 NOTE — PROGRESS NOTES
[facility] Established Patient/H&P:    Ju Barraza is a 69 y.o. male who presents for high-risk prostate cancer.    Patient with a history of kidney stones who underwent routine colonoscopy for a positive Cologuard. 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      CT chest abdomen pelvis with contrast on 24 with a 4.6 cm cecal mass highly suspicious for primary colon neoplasm and with enlarged right retroperitoneal lymph nodes consistent with neoplastic nodes. Index nodes detailed above. There is also a enlarged prostate gland and with enlarged retroperitoneal nodes also recommend correlation clinically and with PSA.      Referred to Dr. Fuentes with general surgery for management of his cecal mass. However, his PSA was found to be significantly elevated at 251. Referred to urology for evaluation. Has never had his PSA tested prior.      MRI prostate on 25 with a moderately enlarged 52 cc prostate gland, demonstrating a subcentimeter PI-RADS 4 finding within the left peripheral zone, and diffuse PI-RADS 3 findings throughout the transition zone. Right iliac chain adenopathy concerning for padmini spread of disease. Consider PSMA PET-CT for further evaluation.      He has no significant lower urinary tract symptoms. He has erectile dysfunction. Tried Viagra several years ago, but stopped due to a headache.      Works as a . Brother with a history of pancreatic cancer. Sister  from cancer as well. Never smoked.       Interval History    25: He is now s/p Uronav fusion prostate biopsy on 25. Pathology with Loren 4+4=8 prostate cancer in 1 core. Here today for follow up. PSMA PET CT pending. Of note, he is scheduled for robotic colectomy with Dr. Fuentes tomorrow.      Denies any fever, chills, gross hematuria, flank pain, bone pain, unintentional weight loss,  trauma or family history of  malignancy.        "  PSA  251                  1/14/25     IPSS QoL  3 1 1/29/25    Past Medical History:   Diagnosis Date    Borderline diabetes     Kidney stone     Pre-diabetes        Past Surgical History:   Procedure Laterality Date    COLONOSCOPY, SCREENING, HIGH RISK PATIENT N/A 12/23/2024    Procedure: COLONOSCOPY, SCREENING, HIGH RISK PATIENT;  Surgeon: Loco Orr DO;  Location: [facility];  Service: Endoscopy;  Laterality: N/A;    TRANSRECTAL BIOPSY OF PROSTATE WITH ULTRASOUND GUIDANCE N/A 2/5/2025    Procedure: -uronav-BIOPSY, PROSTATE, RECTAL APPROACH, WITH US GUIDANCE (uploaded from [email or web address]);  Surgeon: Janeth Freire Jr., MD;  Location: [facility];  Service: Urology;  Laterality: N/A;  Uronav prostate biopsy on 2/5/25.       Review of patient's allergies indicates:  No Known Allergies    Medications Reviewed: see MAR    FOCUSED PHYSICAL EXAM:    There were no vitals filed for this visit.  Body mass index is 31.02 kg/m². Weight: 92.5 kg (204 lb) Height: 5' 8" (172.7 cm)       General: Alert, cooperative, no distress, appears stated age  Abdomen: Soft, non-tender, no CVA tenderness, non-distended      LABS:    No results found for this or any previous visit (from the past 2 weeks).      Assessment/Diagnosis:    1. Prostate cancer  bicalutamide (CASODEX) 50 MG Tab    Ambulatory referral/consult to Hematology / Oncology          Plans:    - I spent 40 minutes of the day of this encounter preparing for, treating and managing this patient. Visit today included increased complexity associated with the care of the episodic problem addressed and managing the longitudinal care of the patient due to the serious and/or complex managed problem(s) prostate cancer. Today's visit was spent almost entirely on counseling. We reviewed his diagnosis, stage, grade, risk group, and prognosis. We discussed the concept of low risk, moderate risk, and high risk disease. We discussed the different treatment options including " active surveillance (as well as the surveillance protocol), prostate brachytherapy, IMRT, IGRT, cryotherapy, and both open and robotic prostatectomy.We also discussed the advantages, disadvantages, risks and benefits of the different options. Regarding radiation therapy we discussed treatment planning, the different techniques, short and long term complications. These included radiation cystitis, radiation proctitis, and impotence. We discussed success, failure, and salvage therapeutic options. We discussed surgical therapy in depth including preoperative preparation, surgical technique(including bladder neck and nerve-sparing techniques), postoperative recuperation and recovery, and short and long term complications including UTI, bleeding, blood clots,catheter dislodgement, etc. We discussed the risks of reoperation, incontinence, impotence, and recurrence. We discussed preop and postop Kegels, post op penile rehab, and treatment options for incontinence and impotence. We discussed rates of cancer free survival and recurrence, as well as salvage therapeutic options. We discussed the possible  indications for adjuvant radiation therapy. I answered questions and addressed concerns.  - RX for Bicalutamide x 8 weeks today.   - RTC with the urology NP in 2 weeks for Lupron.   - Proceed with robotic colectomy tomorrow with Dr. Fuentes.   - PSMA PET CT on 3/12/25.   - Referral placed to heme onc.

## 2025-02-21 ENCOUNTER — TELEPHONE (OUTPATIENT)
Dept: SURGERY | Facility: CLINIC | Age: 70
End: 2025-02-21
Payer: MEDICARE

## 2025-02-21 ENCOUNTER — ANESTHESIA (OUTPATIENT)
Dept: SURGERY | Facility: HOSPITAL | Age: 70
End: 2025-02-21
Payer: MEDICARE

## 2025-02-21 ENCOUNTER — HOSPITAL ENCOUNTER (INPATIENT)
Facility: HOSPITAL | Age: 70
LOS: 1 days | Discharge: HOME | DRG: 331 | End: 2025-02-22
Attending: SURGERY | Admitting: SURGERY
Payer: MEDICARE

## 2025-02-21 DIAGNOSIS — Z90.49 S/P RIGHT COLECTOMY: ICD-10-CM

## 2025-02-21 DIAGNOSIS — R19.5 POSITIVE COLORECTAL CANCER SCREENING USING COLOGUARD TEST: ICD-10-CM

## 2025-02-21 DIAGNOSIS — Z01.818 PREOP TESTING: Primary | ICD-10-CM

## 2025-02-21 PROBLEM — C61 PROSTATE CANCER: Status: ACTIVE | Noted: 2025-02-21

## 2025-02-21 PROBLEM — R03.0 ELEVATED BLOOD-PRESSURE READING WITHOUT DIAGNOSIS OF HYPERTENSION: Status: ACTIVE | Noted: 2025-02-21

## 2025-02-21 LAB
ABO + RH BLD: NORMAL
BLD GP AB SCN CELLS X3 SERPL QL: NORMAL
SPECIMEN OUTDATE: NORMAL

## 2025-02-21 PROCEDURE — 63600175 PHARM REV CODE 636 W HCPCS: Performed by: SURGERY

## 2025-02-21 PROCEDURE — 94799 UNLISTED PULMONARY SVC/PX: CPT | Mod: XB

## 2025-02-21 PROCEDURE — 94799 UNLISTED PULMONARY SVC/PX: CPT

## 2025-02-21 PROCEDURE — 37000009 HC ANESTHESIA EA ADD 15 MINS: Performed by: SURGERY

## 2025-02-21 PROCEDURE — 25000003 PHARM REV CODE 250: Performed by: ANESTHESIOLOGY

## 2025-02-21 PROCEDURE — 71000033 HC RECOVERY, INTIAL HOUR: Performed by: SURGERY

## 2025-02-21 PROCEDURE — 4A1BXSH MONITORING OF GASTROINTESTINAL VASCULAR PERFUSION USING INDOCYANINE GREEN DYE, EXTERNAL APPROACH: ICD-10-PCS | Performed by: SURGERY

## 2025-02-21 PROCEDURE — 25000003 PHARM REV CODE 250: Performed by: NURSE ANESTHETIST, CERTIFIED REGISTERED

## 2025-02-21 PROCEDURE — 63600175 PHARM REV CODE 636 W HCPCS: Mod: JZ,TB | Performed by: SURGERY

## 2025-02-21 PROCEDURE — 63600175 PHARM REV CODE 636 W HCPCS: Performed by: NURSE ANESTHETIST, CERTIFIED REGISTERED

## 2025-02-21 PROCEDURE — 0DTF4ZZ RESECTION OF RIGHT LARGE INTESTINE, PERCUTANEOUS ENDOSCOPIC APPROACH: ICD-10-PCS | Performed by: SURGERY

## 2025-02-21 PROCEDURE — 36000713 HC OR TIME LEV V EA ADD 15 MIN: Performed by: SURGERY

## 2025-02-21 PROCEDURE — 25000003 PHARM REV CODE 250: Performed by: SURGERY

## 2025-02-21 PROCEDURE — 8E0W4CZ ROBOTIC ASSISTED PROCEDURE OF TRUNK REGION, PERCUTANEOUS ENDOSCOPIC APPROACH: ICD-10-PCS | Performed by: SURGERY

## 2025-02-21 PROCEDURE — 11000001 HC ACUTE MED/SURG PRIVATE ROOM

## 2025-02-21 PROCEDURE — 99900035 HC TECH TIME PER 15 MIN (STAT)

## 2025-02-21 PROCEDURE — 94760 N-INVAS EAR/PLS OXIMETRY 1: CPT

## 2025-02-21 PROCEDURE — 86901 BLOOD TYPING SEROLOGIC RH(D): CPT | Performed by: SURGERY

## 2025-02-21 PROCEDURE — 27201423 OPTIME MED/SURG SUP & DEVICES STERILE SUPPLY: Performed by: SURGERY

## 2025-02-21 PROCEDURE — 44205 LAP COLECTOMY PART W/ILEUM: CPT | Mod: ,,, | Performed by: SURGERY

## 2025-02-21 PROCEDURE — 88309 TISSUE EXAM BY PATHOLOGIST: CPT | Mod: TC | Performed by: PATHOLOGY

## 2025-02-21 PROCEDURE — 71000039 HC RECOVERY, EACH ADD'L HOUR: Performed by: SURGERY

## 2025-02-21 PROCEDURE — 63600175 PHARM REV CODE 636 W HCPCS: Mod: TB,JZ | Performed by: NURSE ANESTHETIST, CERTIFIED REGISTERED

## 2025-02-21 PROCEDURE — 36415 COLL VENOUS BLD VENIPUNCTURE: CPT | Performed by: SURGERY

## 2025-02-21 PROCEDURE — 36000712 HC OR TIME LEV V 1ST 15 MIN: Performed by: SURGERY

## 2025-02-21 PROCEDURE — 94761 N-INVAS EAR/PLS OXIMETRY MLT: CPT

## 2025-02-21 PROCEDURE — 99406 BEHAV CHNG SMOKING 3-10 MIN: CPT

## 2025-02-21 PROCEDURE — 37000008 HC ANESTHESIA 1ST 15 MINUTES: Performed by: SURGERY

## 2025-02-21 RX ORDER — ONDANSETRON HYDROCHLORIDE 2 MG/ML
INJECTION, SOLUTION INTRAVENOUS
Status: DISCONTINUED | OUTPATIENT
Start: 2025-02-21 | End: 2025-02-21

## 2025-02-21 RX ORDER — HYDROMORPHONE HYDROCHLORIDE 2 MG/ML
0.2 INJECTION, SOLUTION INTRAMUSCULAR; INTRAVENOUS; SUBCUTANEOUS EVERY 5 MIN PRN
Status: DISCONTINUED | OUTPATIENT
Start: 2025-02-21 | End: 2025-02-21 | Stop reason: HOSPADM

## 2025-02-21 RX ORDER — INDOCYANINE GREEN AND WATER 25 MG
KIT INJECTION
Status: DISCONTINUED | OUTPATIENT
Start: 2025-02-21 | End: 2025-02-21

## 2025-02-21 RX ORDER — LIDOCAINE HYDROCHLORIDE 20 MG/ML
INJECTION INTRAVENOUS
Status: DISCONTINUED | OUTPATIENT
Start: 2025-02-21 | End: 2025-02-21

## 2025-02-21 RX ORDER — ONDANSETRON HYDROCHLORIDE 2 MG/ML
4 INJECTION, SOLUTION INTRAVENOUS EVERY 6 HOURS PRN
Status: DISCONTINUED | OUTPATIENT
Start: 2025-02-21 | End: 2025-02-21

## 2025-02-21 RX ORDER — CEFTRIAXONE 2 G/1
2 INJECTION, POWDER, FOR SOLUTION INTRAMUSCULAR; INTRAVENOUS
Status: COMPLETED | OUTPATIENT
Start: 2025-02-21 | End: 2025-02-21

## 2025-02-21 RX ORDER — LIDOCAINE HYDROCHLORIDE 10 MG/ML
1 INJECTION, SOLUTION EPIDURAL; INFILTRATION; INTRACAUDAL; PERINEURAL ONCE
Status: DISCONTINUED | OUTPATIENT
Start: 2025-02-21 | End: 2025-02-21 | Stop reason: HOSPADM

## 2025-02-21 RX ORDER — ROCURONIUM BROMIDE 10 MG/ML
INJECTION, SOLUTION INTRAVENOUS
Status: DISCONTINUED | OUTPATIENT
Start: 2025-02-21 | End: 2025-02-21

## 2025-02-21 RX ORDER — FENTANYL CITRATE 50 UG/ML
INJECTION, SOLUTION INTRAMUSCULAR; INTRAVENOUS
Status: DISCONTINUED | OUTPATIENT
Start: 2025-02-21 | End: 2025-02-21

## 2025-02-21 RX ORDER — ONDANSETRON HYDROCHLORIDE 2 MG/ML
4 INJECTION, SOLUTION INTRAVENOUS EVERY 12 HOURS PRN
Status: DISCONTINUED | OUTPATIENT
Start: 2025-02-21 | End: 2025-02-22 | Stop reason: HOSPADM

## 2025-02-21 RX ORDER — HYDROMORPHONE HYDROCHLORIDE 1 MG/ML
1 INJECTION, SOLUTION INTRAMUSCULAR; INTRAVENOUS; SUBCUTANEOUS EVERY 4 HOURS PRN
Status: DISCONTINUED | OUTPATIENT
Start: 2025-02-21 | End: 2025-02-22 | Stop reason: HOSPADM

## 2025-02-21 RX ORDER — DIPHENHYDRAMINE HYDROCHLORIDE 50 MG/ML
12.5 INJECTION INTRAMUSCULAR; INTRAVENOUS EVERY 4 HOURS PRN
Status: DISCONTINUED | OUTPATIENT
Start: 2025-02-21 | End: 2025-02-22 | Stop reason: HOSPADM

## 2025-02-21 RX ORDER — ONDANSETRON HYDROCHLORIDE 2 MG/ML
4 INJECTION, SOLUTION INTRAVENOUS DAILY PRN
Status: DISCONTINUED | OUTPATIENT
Start: 2025-02-21 | End: 2025-02-21 | Stop reason: HOSPADM

## 2025-02-21 RX ORDER — ACETAMINOPHEN 10 MG/ML
1000 INJECTION, SOLUTION INTRAVENOUS EVERY 8 HOURS
Status: COMPLETED | OUTPATIENT
Start: 2025-02-21 | End: 2025-02-22

## 2025-02-21 RX ORDER — NALOXONE HCL 0.4 MG/ML
0.02 VIAL (ML) INJECTION
Status: DISCONTINUED | OUTPATIENT
Start: 2025-02-21 | End: 2025-02-22 | Stop reason: HOSPADM

## 2025-02-21 RX ORDER — METRONIDAZOLE 500 MG/100ML
500 INJECTION, SOLUTION INTRAVENOUS
Status: COMPLETED | OUTPATIENT
Start: 2025-02-21 | End: 2025-02-21

## 2025-02-21 RX ORDER — BUPIVACAINE 13.3 MG/ML
INJECTION, SUSPENSION, LIPOSOMAL INFILTRATION
Status: DISCONTINUED | OUTPATIENT
Start: 2025-02-21 | End: 2025-02-21 | Stop reason: HOSPADM

## 2025-02-21 RX ORDER — IBUPROFEN 600 MG/1
600 TABLET ORAL 4 TIMES DAILY
Status: DISCONTINUED | OUTPATIENT
Start: 2025-02-21 | End: 2025-02-22 | Stop reason: HOSPADM

## 2025-02-21 RX ORDER — PHENYLEPHRINE HYDROCHLORIDE 10 MG/ML
INJECTION INTRAVENOUS
Status: DISCONTINUED | OUTPATIENT
Start: 2025-02-21 | End: 2025-02-21

## 2025-02-21 RX ORDER — METOCLOPRAMIDE HYDROCHLORIDE 5 MG/ML
10 INJECTION INTRAMUSCULAR; INTRAVENOUS EVERY 10 MIN PRN
Status: DISCONTINUED | OUTPATIENT
Start: 2025-02-21 | End: 2025-02-21 | Stop reason: HOSPADM

## 2025-02-21 RX ORDER — DEXTROSE, SODIUM CHLORIDE, SODIUM LACTATE, POTASSIUM CHLORIDE, AND CALCIUM CHLORIDE 5; .6; .31; .03; .02 G/100ML; G/100ML; G/100ML; G/100ML; G/100ML
INJECTION, SOLUTION INTRAVENOUS CONTINUOUS
Status: DISCONTINUED | OUTPATIENT
Start: 2025-02-21 | End: 2025-02-22 | Stop reason: HOSPADM

## 2025-02-21 RX ORDER — CEFAZOLIN 2 G/1
2 INJECTION, POWDER, FOR SOLUTION INTRAMUSCULAR; INTRAVENOUS
Status: COMPLETED | OUTPATIENT
Start: 2025-02-21 | End: 2025-02-21

## 2025-02-21 RX ORDER — MIDAZOLAM HYDROCHLORIDE 1 MG/ML
INJECTION INTRAMUSCULAR; INTRAVENOUS
Status: DISCONTINUED | OUTPATIENT
Start: 2025-02-21 | End: 2025-02-21

## 2025-02-21 RX ORDER — PROPOFOL 10 MG/ML
VIAL (ML) INTRAVENOUS
Status: DISCONTINUED | OUTPATIENT
Start: 2025-02-21 | End: 2025-02-21

## 2025-02-21 RX ORDER — OXYCODONE HYDROCHLORIDE 5 MG/1
5 TABLET ORAL
Status: DISCONTINUED | OUTPATIENT
Start: 2025-02-21 | End: 2025-02-21 | Stop reason: HOSPADM

## 2025-02-21 RX ORDER — ENOXAPARIN SODIUM 100 MG/ML
40 INJECTION SUBCUTANEOUS EVERY 24 HOURS
Status: DISCONTINUED | OUTPATIENT
Start: 2025-02-22 | End: 2025-02-22 | Stop reason: HOSPADM

## 2025-02-21 RX ORDER — DEXAMETHASONE SODIUM PHOSPHATE 4 MG/ML
INJECTION, SOLUTION INTRA-ARTICULAR; INTRALESIONAL; INTRAMUSCULAR; INTRAVENOUS; SOFT TISSUE
Status: DISCONTINUED | OUTPATIENT
Start: 2025-02-21 | End: 2025-02-21

## 2025-02-21 RX ORDER — BUPIVACAINE HYDROCHLORIDE AND EPINEPHRINE 2.5; 5 MG/ML; UG/ML
INJECTION, SOLUTION EPIDURAL; INFILTRATION; INTRACAUDAL; PERINEURAL
Status: DISCONTINUED | OUTPATIENT
Start: 2025-02-21 | End: 2025-02-21 | Stop reason: HOSPADM

## 2025-02-21 RX ORDER — KETOROLAC TROMETHAMINE 30 MG/ML
INJECTION, SOLUTION INTRAMUSCULAR; INTRAVENOUS
Status: DISCONTINUED | OUTPATIENT
Start: 2025-02-21 | End: 2025-02-21

## 2025-02-21 RX ORDER — BISACODYL 5 MG
5 TABLET, DELAYED RELEASE (ENTERIC COATED) ORAL NIGHTLY
Status: DISCONTINUED | OUTPATIENT
Start: 2025-02-21 | End: 2025-02-22 | Stop reason: HOSPADM

## 2025-02-21 RX ORDER — SODIUM CHLORIDE 9 MG/ML
INJECTION, SOLUTION INTRAVENOUS CONTINUOUS
Status: DISCONTINUED | OUTPATIENT
Start: 2025-02-21 | End: 2025-02-21

## 2025-02-21 RX ORDER — MUPIROCIN 20 MG/G
OINTMENT TOPICAL 2 TIMES DAILY
Status: DISCONTINUED | OUTPATIENT
Start: 2025-02-21 | End: 2025-02-22 | Stop reason: HOSPADM

## 2025-02-21 RX ORDER — SODIUM CHLORIDE, SODIUM LACTATE, POTASSIUM CHLORIDE, CALCIUM CHLORIDE 600; 310; 30; 20 MG/100ML; MG/100ML; MG/100ML; MG/100ML
INJECTION, SOLUTION INTRAVENOUS CONTINUOUS
Status: DISCONTINUED | OUTPATIENT
Start: 2025-02-21 | End: 2025-02-21

## 2025-02-21 RX ORDER — LORAZEPAM 2 MG/ML
0.25 INJECTION INTRAMUSCULAR EVERY 4 HOURS PRN
Status: DISCONTINUED | OUTPATIENT
Start: 2025-02-21 | End: 2025-02-22 | Stop reason: HOSPADM

## 2025-02-21 RX ORDER — SUCCINYLCHOLINE CHLORIDE 20 MG/ML
INJECTION INTRAMUSCULAR; INTRAVENOUS
Status: DISCONTINUED | OUTPATIENT
Start: 2025-02-21 | End: 2025-02-21

## 2025-02-21 RX ORDER — OXYCODONE HYDROCHLORIDE 5 MG/1
5 TABLET ORAL EVERY 4 HOURS PRN
Status: DISCONTINUED | OUTPATIENT
Start: 2025-02-21 | End: 2025-02-22 | Stop reason: HOSPADM

## 2025-02-21 RX ORDER — GABAPENTIN 100 MG/1
200 CAPSULE ORAL 2 TIMES DAILY
Status: DISCONTINUED | OUTPATIENT
Start: 2025-02-21 | End: 2025-02-22 | Stop reason: HOSPADM

## 2025-02-21 RX ADMIN — ROCURONIUM BROMIDE 10 MG: 10 INJECTION, SOLUTION INTRAVENOUS at 02:02

## 2025-02-21 RX ADMIN — PHENYLEPHRINE HYDROCHLORIDE 200 MCG: 10 INJECTION INTRAVENOUS at 12:02

## 2025-02-21 RX ADMIN — BISACODYL 5 MG: 5 TABLET, COATED ORAL at 08:02

## 2025-02-21 RX ADMIN — KETOROLAC TROMETHAMINE 30 MG: 30 INJECTION, SOLUTION INTRAMUSCULAR; INTRAVENOUS at 02:02

## 2025-02-21 RX ADMIN — PHENYLEPHRINE HYDROCHLORIDE 0.36 MCG/KG/MIN: 10 INJECTION INTRAVENOUS at 12:02

## 2025-02-21 RX ADMIN — INDOCYANINE GREEN 7.5 MG: KIT INTRAVENOUS at 01:02

## 2025-02-21 RX ADMIN — ACETAMINOPHEN 1000 MG: 10 INJECTION INTRAVENOUS at 04:02

## 2025-02-21 RX ADMIN — METRONIDAZOLE 500 MG: 5 INJECTION, SOLUTION INTRAVENOUS at 10:02

## 2025-02-21 RX ADMIN — METRONIDAZOLE 500 MG: 500 INJECTION, SOLUTION INTRAVENOUS at 12:02

## 2025-02-21 RX ADMIN — SODIUM CHLORIDE, SODIUM LACTATE, POTASSIUM CHLORIDE, CALCIUM CHLORIDE AND DEXTROSE MONOHYDRATE: 5; 600; 310; 30; 20 INJECTION, SOLUTION INTRAVENOUS at 04:02

## 2025-02-21 RX ADMIN — IBUPROFEN 600 MG: 600 TABLET ORAL at 08:02

## 2025-02-21 RX ADMIN — ROCURONIUM BROMIDE 10 MG: 10 INJECTION, SOLUTION INTRAVENOUS at 12:02

## 2025-02-21 RX ADMIN — MIDAZOLAM HYDROCHLORIDE 2 MG: 1 INJECTION, SOLUTION INTRAMUSCULAR; INTRAVENOUS at 11:02

## 2025-02-21 RX ADMIN — ROCURONIUM BROMIDE 10 MG: 10 INJECTION, SOLUTION INTRAVENOUS at 11:02

## 2025-02-21 RX ADMIN — GABAPENTIN 200 MG: 100 CAPSULE ORAL at 08:02

## 2025-02-21 RX ADMIN — SUGAMMADEX 100 MG: 100 INJECTION, SOLUTION INTRAVENOUS at 02:02

## 2025-02-21 RX ADMIN — CEFAZOLIN 2 G: 2 INJECTION, POWDER, FOR SOLUTION INTRAMUSCULAR; INTRAVENOUS at 10:02

## 2025-02-21 RX ADMIN — FENTANYL CITRATE 50 MCG: 50 INJECTION, SOLUTION INTRAMUSCULAR; INTRAVENOUS at 02:02

## 2025-02-21 RX ADMIN — ROCURONIUM BROMIDE 10 MG: 10 INJECTION, SOLUTION INTRAVENOUS at 01:02

## 2025-02-21 RX ADMIN — IBUPROFEN 600 MG: 600 TABLET ORAL at 05:02

## 2025-02-21 RX ADMIN — FENTANYL CITRATE 50 MCG: 50 INJECTION, SOLUTION INTRAMUSCULAR; INTRAVENOUS at 12:02

## 2025-02-21 RX ADMIN — SUCCINYLCHOLINE CHLORIDE 160 MG: 20 INJECTION, SOLUTION INTRAMUSCULAR; INTRAVENOUS at 11:02

## 2025-02-21 RX ADMIN — MUPIROCIN 1 G: 20 OINTMENT TOPICAL at 08:02

## 2025-02-21 RX ADMIN — PROPOFOL 170 MG: 10 INJECTION, EMULSION INTRAVENOUS at 11:02

## 2025-02-21 RX ADMIN — SODIUM CHLORIDE, SODIUM GLUCONATE, SODIUM ACETATE, POTASSIUM CHLORIDE AND MAGNESIUM CHLORIDE: 526; 502; 368; 37; 30 INJECTION, SOLUTION INTRAVENOUS at 11:02

## 2025-02-21 RX ADMIN — LIDOCAINE HYDROCHLORIDE 100 MG: 20 INJECTION, SOLUTION INTRAVENOUS at 11:02

## 2025-02-21 RX ADMIN — FENTANYL CITRATE 50 MCG: 50 INJECTION, SOLUTION INTRAMUSCULAR; INTRAVENOUS at 11:02

## 2025-02-21 RX ADMIN — ONDANSETRON 4 MG: 2 INJECTION INTRAMUSCULAR; INTRAVENOUS at 02:02

## 2025-02-21 RX ADMIN — DEXAMETHASONE SODIUM PHOSPHATE 4 MG: 4 INJECTION, SOLUTION INTRA-ARTICULAR; INTRALESIONAL; INTRAMUSCULAR; INTRAVENOUS; SOFT TISSUE at 11:02

## 2025-02-21 RX ADMIN — FENTANYL CITRATE 50 MCG: 50 INJECTION, SOLUTION INTRAMUSCULAR; INTRAVENOUS at 01:02

## 2025-02-21 RX ADMIN — SODIUM CHLORIDE, SODIUM GLUCONATE, SODIUM ACETATE, POTASSIUM CHLORIDE AND MAGNESIUM CHLORIDE: 526; 502; 368; 37; 30 INJECTION, SOLUTION INTRAVENOUS at 12:02

## 2025-02-21 RX ADMIN — CEFTRIAXONE 2 G: 2 INJECTION, POWDER, FOR SOLUTION INTRAMUSCULAR; INTRAVENOUS at 11:02

## 2025-02-21 RX ADMIN — METRONIDAZOLE 500 MG: 5 INJECTION, SOLUTION INTRAVENOUS at 04:02

## 2025-02-21 RX ADMIN — ONDANSETRON 4 MG: 2 INJECTION INTRAMUSCULAR; INTRAVENOUS at 11:02

## 2025-02-21 RX ADMIN — CEFAZOLIN 2 G: 2 INJECTION, POWDER, FOR SOLUTION INTRAMUSCULAR; INTRAVENOUS at 04:02

## 2025-02-21 RX ADMIN — ROCURONIUM BROMIDE 40 MG: 10 INJECTION, SOLUTION INTRAVENOUS at 12:02

## 2025-02-21 NOTE — PLAN OF CARE
Unable to complete assessment at this time.     02/21/25 [id number]   Discharge Assessment   Assessment Type Discharge Planning Assessment

## 2025-02-21 NOTE — ANESTHESIA PREPROCEDURE EVALUATION
02/21/2025  Ju Barraza is a 69 y.o., male.      Pre-op Assessment    I have reviewed the Patient Summary Reports.     I have reviewed the Nursing Notes. I have reviewed the NPO Status.   I have reviewed the Medications.     Review of Systems  Anesthesia Hx:  No problems with previous Anesthesia                Cardiovascular:  Exercise tolerance: good                 ECG has been reviewed.                            Pulmonary:  Pulmonary Normal                       Renal/:  Chronic Renal Disease   Prostate CA             Hepatic/GI:  Bowel Prep.      Colon Ca - tubular adenoma                 Physical Exam  General: Well nourished    Airway:  Mallampati: II   Mouth Opening: Normal  TM Distance: Normal  Tongue: Normal  Neck ROM: Normal ROM    Dental:  Intact, Edentulous, Dentures    Chest/Lungs:  Clear to auscultation, Normal Respiratory Rate      Anesthesia Plan  Type of Anesthesia, risks & benefits discussed:    Anesthesia Type: Gen ETT  Intra-op Monitoring Plan: Standard ASA Monitors  Post Op Pain Control Plan: multimodal analgesia and IV/PO Opioids PRN  Induction:  IV and Inhalation  Informed Consent: Informed consent signed with the Patient and all parties understand the risks and agree with anesthesia plan.  All questions answered.   ASA Score: 3    Ready For Surgery From Anesthesia Perspective.     .

## 2025-02-21 NOTE — HPI
Ju Barraza is a 69 year old male with a previous medical history of kidney stones and erectile dysfunction who is POD 0 for right colectomy with Dr. Fuentes due to mass in cecum. The patient underwent routine colonoscopy for a positive Cologuard. Biopsy of a cecal mass showed tubular adenoma with high grade dysplasia on 12/23/24. Subsequent CT chest abdomen pelvis with contrast on 12/30/24 with a 4.6 cm cecal mass highly suspicious for primary colon neoplasm and with enlarged right retroperitoneal lymph nodes consistent with neoplastic nodes.  There was also a enlarged prostate gland and with enlarged retroperitoneal nodes. He was referred to Dr. Fuentes with general surgery for colon mass and also to Dr. Freire with urology for elevated PSA. Dr. Freire performed a prostate biopsy and it was confirmed prostate cancer. Dr. Freire is following outpatient and prescribed eight weeks of bicalutamide along with PET scan and referral to hematology/oncology per note from 2/21/25. The right robotic colectomy was uneventful and patient seen in room 306. Endorses generalized abdominal tenderness but denies any other adverse issues. Hospital medicine consulted for medical management during admission.

## 2025-02-21 NOTE — PLAN OF CARE
Patient prepared for procedure.  No questions at this time.  Family at bedside.  Belongings in bag and personal suitcase in pacu.  Dentures left at home.

## 2025-02-21 NOTE — TRANSFER OF CARE
"Anesthesia Transfer of Care Note    Patient: Ju Barraza    Procedure(s) Performed: Procedure(s) (LRB):  XI ROBOTIC COLECTOMY, RIGHT (N/A)    Patient location: PACU    Anesthesia Type: general    Transport from OR: Transported from OR on 6-10 L/min O2 by face mask with adequate spontaneous ventilation    Post pain: adequate analgesia    Post assessment: no apparent anesthetic complications    Post vital signs: stable    Level of consciousness: awake    Nausea/Vomiting: no nausea/vomiting    Complications: none    Transfer of care protocol was followed    Last vitals: Visit Vitals  BP (!) 170/83 (BP Location: Right arm, Patient Position: Lying)   Pulse 88   Temp 37.1 °C (98.8 °F) (Skin)   Resp 20   Ht 5' 8.5" (1.74 m)   Wt 92.5 kg (203 lb 14.8 oz)   SpO2 100%   BMI 30.56 kg/m²     "

## 2025-02-21 NOTE — ASSESSMENT & PLAN NOTE
POD 0 s/p right colectomy with Dr. Fuentes  Continue to follow general surgery recommendations.  Needs aggressive incentive spirometry.  Follow hemoglobin and hematocrit closely.  Pain control with IV narcotics and antiemetics as needed.  Lovenox 40mg daily subQ for DVT prophylaxis per general surgery recommendations.   Clear Liquid diet  IV ancef and flagyl Q 8 hours for 2 doses Post-op

## 2025-02-21 NOTE — TELEPHONE ENCOUNTER
I called and spoke to Ms. Pratt to ask her if I can fax her the RTW note and she stated to mail it to her.  I informed her that I can fax it to the hospital and they can give it to her.  Patient understood.  I called and spoke to Amalia @ [facility] and she'll give her the RTW.  Faxed to [phone].  Parviz

## 2025-02-21 NOTE — CARE UPDATE
02/21/25 1723   Patient Assessment/Suction   Level of Consciousness (AVPU) responds to voice   Respiratory Effort Unlabored   Expansion/Accessory Muscles/Retractions no use of accessory muscles;no retractions;expansion symmetric   All Lung Fields Breath Sounds Anterior:;Lateral:;clear   Rhythm/Pattern, Respiratory unlabored;pattern regular;no shortness of breath reported   PRE-TX-O2   Device (Oxygen Therapy) room air   SpO2 (!) 92 %   Pulse Oximetry Type Intermittent   $ Pulse Oximetry - Single Charge Pulse Oximetry - Single   Pulse 82   Resp 16   Incentive Spirometer   $ Incentive Spirometer Charges unable to perform   Incentive Spirometer Predicted Level (mL) 1880   Administration (IS) unable to perform   Tobacco Cessation Intervention   Do you use any type of tobacco product? No   $ Smoking Cessation Charges Smoking Cessation - Intermediate (Non-CTTS)   Respiratory Evaluation   $ Care Plan Tech Time 15 min   $ Respiratory Evaluation Complete   Evaluation For New Orders   Admitting Diagnosis S/P right colectomy   Cardiac Diagnosis N/A   Pulmonary Diagnosis N/A   Current Surgeries S/P right colectomy   Home Oxygen   Has Home Oxygen? No   Oxygen Care Plan   Oxygen Care Plan Per Protocol   SPO2 Goal (%) 92% non-cardiac   Rationale SpO2 is <92% (Non-cardiac Pt.)   Bronchodilator Care Plan   Bronchodilator Care Plan N/A   Rationale No Rationale found   Atelectasis Care Plan   Atelectasis Care Plan Incentive Spiromentry   Frequency TID   I.S. Goal (ml) 1880 ml   I.S. Minimum (ml) 940 ml   Rationale Other  (post op)   Airway Clearance Care Plan   Rationale No rationale found

## 2025-02-21 NOTE — H&P
[facility]  History & Physical    Patient Name: Ju Barraza  MRN: [id number]  Patient Class: IP- Inpatient  Admission Date: 2/21/2025  Attending Physician: Alexis Fuentes MD   Primary Care Provider: Prem Iyer MD         Patient information was obtained from patient, relative(s), past medical records, and ER records.     Subjective:     Principal Problem:S/P right colectomy    Chief Complaint: cecum mass       HPI: Ju Barraza is a 69 year old male with a previous medical history of kidney stones and erectile dysfunction who is POD 0 for right colectomy with Dr. Fuentes due to mass in cecum. The patient underwent routine colonoscopy for a positive Cologuard. Biopsy of a cecal mass showed tubular adenoma with high grade dysplasia on 12/23/24. Subsequent CT chest abdomen pelvis with contrast on 12/30/24 with a 4.6 cm cecal mass highly suspicious for primary colon neoplasm and with enlarged right retroperitoneal lymph nodes consistent with neoplastic nodes.  There was also a enlarged prostate gland and with enlarged retroperitoneal nodes. He was referred to Dr. Fuentes with general surgery for colon mass and also to Dr. Freire with urology for elevated PSA. Dr. Freire performed a prostate biopsy and it was confirmed prostate cancer. Dr. Freire is following outpatient and prescribed eight weeks of bicalutamide along with PET scan and referral to hematology/oncology per note from 2/21/25. The right robotic colectomy was uneventful and patient seen in room 306. Endorses generalized abdominal tenderness but denies any other adverse issues. Hospital medicine consulted for medical management during admission.        Past Medical History:   Diagnosis Date    Borderline diabetes     Kidney stone     Pre-diabetes        Past Surgical History:   Procedure Laterality Date    COLONOSCOPY, SCREENING, HIGH RISK PATIENT N/A 12/23/2024    Procedure: COLONOSCOPY, SCREENING, HIGH RISK  PATIENT;  Surgeon: Loco Orr DO;  Location: [facility] ENDO;  Service: Endoscopy;  Laterality: N/A;    TRANSRECTAL BIOPSY OF PROSTATE WITH ULTRASOUND GUIDANCE N/A 2/5/2025    Procedure: -uronav-BIOPSY, PROSTATE, RECTAL APPROACH, WITH US GUIDANCE (uploaded from [employer]);  Surgeon: Janeth Freire Jr., MD;  Location: [facility] ASU OR;  Service: Urology;  Laterality: N/A;  Uronav prostate biopsy on 2/5/25.       Review of patient's allergies indicates:  No Known Allergies    No current facility-administered medications on file prior to encounter.     No current outpatient medications on file prior to encounter.     Family History       Problem Relation (Age of Onset)    No Known Problems Father, Mother          Tobacco Use    Smoking status: Never     Passive exposure: Never    Smokeless tobacco: Never   Substance and Sexual Activity    Alcohol use: Never    Drug use: Never    Sexual activity: Yes     Partners: Female     Comment:      Review of Systems   Gastrointestinal:  Positive for abdominal pain.   All other systems reviewed and are negative.    Objective:     Vital Signs (Most Recent):  Temp: 98.2 °F (36.8 °C) (02/21/25 1604)  Pulse: 85 (02/21/25 1604)  Resp: 16 (02/21/25 1604)  BP: (!) 148/82 (02/21/25 1604)  SpO2: (!) 94 % (02/21/25 1604) Vital Signs (24h Range):  Temp:  [97.5 °F (36.4 °C)-98.8 °F (37.1 °C)] 98.2 °F (36.8 °C)  Pulse:  [78-88] 85  Resp:  [12-20] 16  SpO2:  [94 %-100 %] 94 %  BP: (129-170)/(67-92) 148/82     Weight: 92.5 kg (203 lb 14.8 oz)  Body mass index is 30.56 kg/m².     Physical Exam  Vitals reviewed.   Constitutional:       General: He is not in acute distress.  HENT:      Head: Normocephalic and atraumatic.      Nose: Nose normal.      Mouth/Throat:      Mouth: Mucous membranes are dry.      Pharynx: Oropharynx is clear.   Eyes:      Conjunctiva/sclera: Conjunctivae normal.   Cardiovascular:      Rate and Rhythm: Normal rate and regular rhythm.      Pulses: Normal pulses.       Heart sounds: Normal heart sounds.   Pulmonary:      Effort: Pulmonary effort is normal.      Breath sounds: Normal breath sounds.   Abdominal:      General: Bowel sounds are decreased.      Tenderness: There is generalized abdominal tenderness.          Comments: Large surgical bandage to right lower quadrant that is intact with small amount dried sanguneious drainage  Other surgical incisions intact covered with bandaids with no erythema or drainage   Genitourinary:     Comments: Hallman cath in place draining clear yellow urine  Musculoskeletal:         General: Normal range of motion.      Cervical back: Normal range of motion.      Right lower leg: No edema.      Left lower leg: No edema.   Skin:     General: Skin is warm and dry.      Capillary Refill: Capillary refill takes less than 2 seconds.   Neurological:      General: No focal deficit present.      Mental Status: He is oriented to person, place, and time. Mental status is at baseline.   Psychiatric:         Mood and Affect: Mood normal.         Behavior: Behavior normal.         Thought Content: Thought content normal.         Judgment: Judgment normal.                Significant Labs: All pertinent labs within the past 24 hours have been reviewed.  Pre-op labs reviewed    Significant Imaging: I have reviewed all pertinent imaging results/findings within the past 24 hours.  EXAMINATION:  MRI PROSTATE W W/O CONTRAST     CLINICAL HISTORY:  Prostate cancer suspected;Elevated PSA;  Elevated prostate specific antigen (PSA)     TECHNIQUE:  Multiparametric MRI of the prostate/pelvis performed on a 1.5 T scanner with phase pelvic coil. Multiplanar, multisequence images including high resolution, small field-of-view T2-WI; axial diffusion weighted images with multiple B-values and creation of ADC-maps; and dynamic contrast enhanced T1-weighted images through the prostate were obtained before, during, and after the administration of 9 cc intravenous  gadolinium.     COMPARISON:  None.     FINDINGS:  PROSTATE: 4.8 x 4.6 x 4.7 cm corresponding to a computed volume of 52 cc.     PERIPHERAL ZONE:     Lesion (JOSEFA) #P-1     Location: Side: left; Region: mid; Zone: posterior peripheral zone laterally     Greatest dimension: 0.8 cm     T2-WI: Circumscribed, homogeneous moderate hypointense focus/mass confined to prostate and <1.5 cm in greatest dimension, score 4.     DWI/ADC: Focal markedly hypointense on ADC and markedly hyperintense on high b-value DWI; <1.5 cm in greatest dimension, score 4.     DCE: Negative     Prostate Margin: Mild abutment without visible extraprostatic extension.     PI-RADS assessment category: 4     TRANSITIONAL ZONE: There is a patchy ill-defined T2 hypointense signal throughout the transition zone accompanied by patchy, mild restricted diffusion.  PI-RADS 3.  Broad capsular abutment without visible extraprostatic extension.     NEUROVASCULAR BUNDLE: No evidence for invasion.     SEMINAL VESICLES: No evidence for invasion.     ADJACENT ORGAN INVOLVEMENT: No evidence for urinary bladder or rectal invasion.     LYMPHADENOPATHY: Right iliac chain lymphadenopathy is present, including a solid mass presumably representing an enlarged lymph node measuring 4.0 x 3.2 cm.  Additional, smaller enlarged nodes measure up to 1.2 cm short axis.     Impression:     1. Moderately enlarged prostate gland, demonstrating a subcentimeter PI-RADS 4 finding within the left peripheral zone, and diffuse PI-RADS 3 findings throughout the transition zone.  Right iliac chain adenopathy concerning for padmini spread of disease.  Consider PSMA PET-CT for further evaluation.  Overall Assessment: PI-RADS 4 - High (clinically significant cancer is likely to be present)     Extraprostatic extension: No visible direct extraprostatic invasion.  Right iliac chain lymphadenopathy, including a presumed enlarged lymph node measuring 4 cm.     Number of targets created for potential  MR/US fusion biopsy     Peripheral zone: 1     Transition zone: 0     This report was flagged in Epic as abnormal.        Electronically signed by:Cl Henderson MD  Date:                                            01/21/2025  Time:                                           10:27      EXAMINATION:  CT CHEST ABDOMEN PELVIS WITH IV CONTRAST (XPD)     CLINICAL HISTORY:  Colon cancer, metastatic, staging;Cecal mass; Other specified diseases of intestine     TECHNIQUE:  Low dose axial images, sagittal and coronal reformations were obtained from the thoracic inlet to the pubic symphysis following the IV administration of 100 mL of Omnipaque 350 no p.o. contrast     COMPARISON:  None     FINDINGS:  ; The visualized structures at the base the neck are unremarkable appearance.  There is no significant hilar or mediastinal adenopathy.  There is no pleural or pericardial effusion.  There are mild dependent hypoventilatory changes in the lungs.  Mild apical scarring.  5 mm right apical nodule series 6, image 33.  Smooth oval 7 mm juxtapleural nodule left lung series 6, image 195.     And pelvis; a few scattered cysts in the liver largest measuring 9 mm.  Next largest measures 6 mm.  Tiny subcentimeter hypodensities too small to reliably characterize but also suggesting most likely representing cysts.     Spleen not enlarged     Adrenal glands; slight diffuse thickening     Pancreas unremarkable appearance     No calcified stones the gallbladder or CT findings of acute cholecystitis no biliary duct dilatation     Abdominal aorta tapers without aneurysmal dilatation     Symmetrical renal enhancement and no hydronephrosis.  7 mm hypodensity left kidney consistent with a cyst and tinier hypodensities too small to reliably characterize but suggesting also most likely representing cysts.     Small hiatal hernia.  CT findings of acute appendicitis.     4.6 by 3.7 by 3.4 cm masslike density appearing within the cecum.  Less likely  merely stool.  Recommend correlation clinically in this patient with history of recent colonoscopy.  There is retroperitoneal adenopathy.  Index lesions     1.  3.7 x 2.9 by 3.9 cm along the right external iliac chain.  2. 1.3 by 1.1 by 1 cm mass right external iliac chain along the right side of the urinary bladder.  The urinary bladder is mildly distended at time of the exam and as visualized is unremarkable appearance     Prostate gland enlarged measuring 5 cm transversely     Small fat containing umbilical hernia.  Osseous degenerative changes without obvious aggressive appearing osseous lesion     Impression:     4.6 cm cecal mass highly suspicious for primary colon neoplasm and with enlarged right retroperitoneal lymph nodes consistent with neoplastic nodes.  Index nodes detailed above.  There is also a enlarged prostate gland and with enlarged retroperitoneal nodes also recommend correlation clinically and with PSA.     Additional findings as detailed above including 5 mm right apical nodule.  For a solid nodule <6 mm, Fleischner Society 2017 guidelines recommend no routine follow up for a low risk patient, or follow-up with non-contrast chest CT at 12 months in a high risk patient.        Electronically signed by:Ramila Connor MD  Date:                                            12/30/2024  Time:                                           17:15  Assessment/Plan:     * S/P right colectomy  POD 0 s/p right colectomy with Dr. Fuentes  Continue to follow general surgery recommendations.  Needs aggressive incentive spirometry.  Follow hemoglobin and hematocrit closely.  Pain control with IV narcotics and antiemetics as needed.  Lovenox 40mg daily subQ for DVT prophylaxis per general surgery recommendations.   Clear Liquid diet  IV ancef and flagyl Q 8 hours for 2 doses Post-op        Elevated blood-pressure reading without diagnosis of hypertension  Possibly secondary to pain. No chest pain or shortness of  "breath    -Trend blood pressure and will administer PRN IV medications for blood pressure 160/100 or greater      Prostate cancer  Being Followed by Dr. Freire    Plans Per his note from 2/21/25  "- RX for Bicalutamide x 8 weeks today.   - RTC with the urology NP in 2 weeks for Lupron.   - Proceed with robotic colectomy tomorrow with Dr. Fuentes.   - PSMA PET CT on 3/12/25.   - Referral placed to heme onc"        VTE Risk Mitigation (From admission, onward)           Ordered     enoxaparin injection 40 mg  Daily         02/21/25 1622     IP VTE HIGH RISK PATIENT  Once         02/21/25 1622     Place sequential compression device  Until discontinued         02/21/25 1622     IP VTE HIGH RISK PATIENT  Once         02/21/25 0959                                    Kelley Ames NP  Department of Hospital Medicine  [facility]          "

## 2025-02-21 NOTE — SUBJECTIVE & OBJECTIVE
Past Medical History:   Diagnosis Date    Borderline diabetes     Kidney stone     Pre-diabetes        Past Surgical History:   Procedure Laterality Date    COLONOSCOPY, SCREENING, HIGH RISK PATIENT N/A 12/23/2024    Procedure: COLONOSCOPY, SCREENING, HIGH RISK PATIENT;  Surgeon: Loco Orr DO;  Location: [facility] END[facility];  Service: Endoscopy;  Laterality: N/A;    TRANSRECTAL BIOPSY OF PROSTATE WITH ULTRASOUND GUIDANCE N/A 2/5/2025    Procedure: -uronav-BIOPSY, PROSTATE, RECTAL APPROACH, WITH US GUIDANCE (uploaded from [employer]);  Surgeon: Janeth Freire Jr., MD;  Location: [facility] OR;  Service: Urology;  Laterality: N/A;  Uronav prostate biopsy on 2/5/25.       Review of patient's allergies indicates:  No Known Allergies    No current facility-administered medications on file prior to encounter.     No current outpatient medications on file prior to encounter.     Family History       Problem Relation (Age of Onset)    No Known Problems Father, Mother          Tobacco Use    Smoking status: Never     Passive exposure: Never    Smokeless tobacco: Never   Substance and Sexual Activity    Alcohol use: Never    Drug use: Never    Sexual activity: Yes     Partners: Female     Comment:      Review of Systems   Gastrointestinal:  Positive for abdominal pain.   All other systems reviewed and are negative.    Objective:     Vital Signs (Most Recent):  Temp: 98.2 °F (36.8 °C) (02/21/25 1604)  Pulse: 85 (02/21/25 1604)  Resp: 16 (02/21/25 1604)  BP: (!) 148/82 (02/21/25 1604)  SpO2: (!) 94 % (02/21/25 1604) Vital Signs (24h Range):  Temp:  [97.5 °F (36.4 °C)-98.8 °F (37.1 °C)] 98.2 °F (36.8 °C)  Pulse:  [78-88] 85  Resp:  [12-20] 16  SpO2:  [94 %-100 %] 94 %  BP: (129-170)/(67-92) 148/82     Weight: 92.5 kg (203 lb 14.8 oz)  Body mass index is 30.56 kg/m².     Physical Exam  Vitals reviewed.   Constitutional:       General: He is not in acute distress.  HENT:      Head: Normocephalic and atraumatic.       Nose: Nose normal.      Mouth/Throat:      Mouth: Mucous membranes are dry.      Pharynx: Oropharynx is clear.   Eyes:      Conjunctiva/sclera: Conjunctivae normal.   Cardiovascular:      Rate and Rhythm: Normal rate and regular rhythm.      Pulses: Normal pulses.      Heart sounds: Normal heart sounds.   Pulmonary:      Effort: Pulmonary effort is normal.      Breath sounds: Normal breath sounds.   Abdominal:      General: Bowel sounds are decreased.      Tenderness: There is generalized abdominal tenderness.          Comments: Large surgical bandage to right lower quadrant that is intact with small amount dried sanguneious drainage  Other surgical incisions intact covered with bandaids with no erythema or drainage   Genitourinary:     Comments: Hallman cath in place draining clear yellow urine  Musculoskeletal:         General: Normal range of motion.      Cervical back: Normal range of motion.      Right lower leg: No edema.      Left lower leg: No edema.   Skin:     General: Skin is warm and dry.      Capillary Refill: Capillary refill takes less than 2 seconds.   Neurological:      General: No focal deficit present.      Mental Status: He is oriented to person, place, and time. Mental status is at baseline.   Psychiatric:         Mood and Affect: Mood normal.         Behavior: Behavior normal.         Thought Content: Thought content normal.         Judgment: Judgment normal.                Significant Labs: All pertinent labs within the past 24 hours have been reviewed.  Pre-op labs reviewed    Significant Imaging: I have reviewed all pertinent imaging results/findings within the past 24 hours.  EXAMINATION:  MRI PROSTATE W W/O CONTRAST     CLINICAL HISTORY:  Prostate cancer suspected;Elevated PSA;  Elevated prostate specific antigen (PSA)     TECHNIQUE:  Multiparametric MRI of the prostate/pelvis performed on a 1.5 T scanner with phase pelvic coil. Multiplanar, multisequence images including high resolution,  small field-of-view T2-WI; axial diffusion weighted images with multiple B-values and creation of ADC-maps; and dynamic contrast enhanced T1-weighted images through the prostate were obtained before, during, and after the administration of 9 cc intravenous gadolinium.     COMPARISON:  None.     FINDINGS:  PROSTATE: 4.8 x 4.6 x 4.7 cm corresponding to a computed volume of 52 cc.     PERIPHERAL ZONE:     Lesion (JOSEFA) #P-1     Location: Side: left; Region: mid; Zone: posterior peripheral zone laterally     Greatest dimension: 0.8 cm     T2-WI: Circumscribed, homogeneous moderate hypointense focus/mass confined to prostate and <1.5 cm in greatest dimension, score 4.     DWI/ADC: Focal markedly hypointense on ADC and markedly hyperintense on high b-value DWI; <1.5 cm in greatest dimension, score 4.     DCE: Negative     Prostate Margin: Mild abutment without visible extraprostatic extension.     PI-RADS assessment category: 4     TRANSITIONAL ZONE: There is a patchy ill-defined T2 hypointense signal throughout the transition zone accompanied by patchy, mild restricted diffusion.  PI-RADS 3.  Broad capsular abutment without visible extraprostatic extension.     NEUROVASCULAR BUNDLE: No evidence for invasion.     SEMINAL VESICLES: No evidence for invasion.     ADJACENT ORGAN INVOLVEMENT: No evidence for urinary bladder or rectal invasion.     LYMPHADENOPATHY: Right iliac chain lymphadenopathy is present, including a solid mass presumably representing an enlarged lymph node measuring 4.0 x 3.2 cm.  Additional, smaller enlarged nodes measure up to 1.2 cm short axis.     Impression:     1. Moderately enlarged prostate gland, demonstrating a subcentimeter PI-RADS 4 finding within the left peripheral zone, and diffuse PI-RADS 3 findings throughout the transition zone.  Right iliac chain adenopathy concerning for padmini spread of disease.  Consider PSMA PET-CT for further evaluation.  Overall Assessment: PI-RADS 4 - High  (clinically significant cancer is likely to be present)     Extraprostatic extension: No visible direct extraprostatic invasion.  Right iliac chain lymphadenopathy, including a presumed enlarged lymph node measuring 4 cm.     Number of targets created for potential MR/US fusion biopsy     Peripheral zone: 1     Transition zone: 0     This report was flagged in Epic as abnormal.        Electronically signed by:Cl Henderson MD  Date:                                            01/21/2025  Time:                                           10:27      EXAMINATION:  CT CHEST ABDOMEN PELVIS WITH IV CONTRAST (XPD)     CLINICAL HISTORY:  Colon cancer, metastatic, staging;Cecal mass; Other specified diseases of intestine     TECHNIQUE:  Low dose axial images, sagittal and coronal reformations were obtained from the thoracic inlet to the pubic symphysis following the IV administration of 100 mL of Omnipaque 350 no p.o. contrast     COMPARISON:  None     FINDINGS:  ; The visualized structures at the base the neck are unremarkable appearance.  There is no significant hilar or mediastinal adenopathy.  There is no pleural or pericardial effusion.  There are mild dependent hypoventilatory changes in the lungs.  Mild apical scarring.  5 mm right apical nodule series 6, image 33.  Smooth oval 7 mm juxtapleural nodule left lung series 6, image 195.     And pelvis; a few scattered cysts in the liver largest measuring 9 mm.  Next largest measures 6 mm.  Tiny subcentimeter hypodensities too small to reliably characterize but also suggesting most likely representing cysts.     Spleen not enlarged     Adrenal glands; slight diffuse thickening     Pancreas unremarkable appearance     No calcified stones the gallbladder or CT findings of acute cholecystitis no biliary duct dilatation     Abdominal aorta tapers without aneurysmal dilatation     Symmetrical renal enhancement and no hydronephrosis.  7 mm hypodensity left kidney consistent  with a cyst and tinier hypodensities too small to reliably characterize but suggesting also most likely representing cysts.     Small hiatal hernia.  CT findings of acute appendicitis.     4.6 by 3.7 by 3.4 cm masslike density appearing within the cecum.  Less likely merely stool.  Recommend correlation clinically in this patient with history of recent colonoscopy.  There is retroperitoneal adenopathy.  Index lesions     1.  3.7 x 2.9 by 3.9 cm along the right external iliac chain.  2. 1.3 by 1.1 by 1 cm mass right external iliac chain along the right side of the urinary bladder.  The urinary bladder is mildly distended at time of the exam and as visualized is unremarkable appearance     Prostate gland enlarged measuring 5 cm transversely     Small fat containing umbilical hernia.  Osseous degenerative changes without obvious aggressive appearing osseous lesion     Impression:     4.6 cm cecal mass highly suspicious for primary colon neoplasm and with enlarged right retroperitoneal lymph nodes consistent with neoplastic nodes.  Index nodes detailed above.  There is also a enlarged prostate gland and with enlarged retroperitoneal nodes also recommend correlation clinically and with PSA.     Additional findings as detailed above including 5 mm right apical nodule.  For a solid nodule <6 mm, Fleischner Society 2017 guidelines recommend no routine follow up for a low risk patient, or follow-up with non-contrast chest CT at 12 months in a high risk patient.        Electronically signed by:Ramila Connor MD  Date:                                            12/30/2024  Time:                                           17:15

## 2025-02-21 NOTE — ASSESSMENT & PLAN NOTE
"Being Followed by Dr. Freire    Plans Per his note from 2/21/25  "- RX for Bicalutamide x 8 weeks today.   - RTC with the urology NP in 2 weeks for Lupron.   - Proceed with robotic colectomy tomorrow with Dr. Fuentes.   - PSMA PET CT on 3/12/25.   - Referral placed to heme onc"    "

## 2025-02-21 NOTE — ANESTHESIA PROCEDURE NOTES
Intubation    Date/Time: 2/21/2025 11:51 AM    Performed by: Franco Collins CRNA  Authorized by: Franklin Lewis MD    Intubation:     Induction:  Intravenous    Intubated:  Postinduction    Mask Ventilation:  Easy mask    Attempts:  1    Attempted By:  CRNA    Method of Intubation:  Video laryngoscopy    Laryngeal View Grade: Grade I - full view of cords      Difficult Airway Encountered?: No      Complications:  None    Airway Device:  Oral endotracheal tube    Airway Device Size:  7.5    Style/Cuff Inflation:  Cuffed (inflated to minimal occlusive pressure)    Inflation Amount (mL):  6    Tube secured:  23    Secured at:  The lips    Placement Verified By:  Capnometry    Complicating Factors:  None    Findings Post-Intubation:  BS equal bilateral

## 2025-02-21 NOTE — ANESTHESIA POSTPROCEDURE EVALUATION
Anesthesia Post Evaluation    Patient: Ju Barraza    Procedure(s) Performed: Procedure(s) (LRB):  XI ROBOTIC COLECTOMY, RIGHT (N/A)    Final Anesthesia Type: general      Patient location during evaluation: PACU  Patient participation: Yes- Able to Participate  Level of consciousness: awake and alert  Post-procedure vital signs: reviewed and stable  Pain management: adequate  Airway patency: patent    PONV status at discharge: No PONV  Anesthetic complications: no      Cardiovascular status: hemodynamically stable  Respiratory status: unassisted and room air  Hydration status: euvolemic  Follow-up not needed.              Vitals Value Taken Time   /82 02/21/25 16:04   Temp 36.8 °C (98.2 °F) 02/21/25 16:04   Pulse 85 02/21/25 16:04   Resp 16 02/21/25 16:04   SpO2 94 % 02/21/25 16:04         Event Time   Out of Recovery 15:55:00         Pain/Liban Score: Pain Rating Prior to Med Admin: 3 (2/21/2025  4:17 PM)  Liban Score: 9 (2/21/2025  3:45 PM)

## 2025-02-21 NOTE — BRIEF OP NOTE
[facility]  Brief Operative Note    SUMMARY     Surgery Date: 2/21/2025     Surgeons and Role:     * Alexis Fuentes MD - Primary    Assisting Surgeon: Cori WILLAMS      Pre-op Diagnosis:  Positive colorectal cancer screening using Cologuard test [R19.5]    Post-op Diagnosis:  Post-Op Diagnosis Codes:     * Positive colorectal cancer screening using Cologuard test [R19.5]    Procedure(s) (LRB):  XI ROBOTIC COLECTOMY, RIGHT (N/A)    Anesthesia: General    Implants:  * No implants in log *    Operative Findings: Mass in Cecum.   Robotic R franklin performed without event      Estimated Blood Loss: 100 mL    Estimated Blood Loss has been documented.         Specimens:   Specimen (24h ago, onward)       Start     Ordered    02/21/25 1441  Specimen to Pathology - Surgery  Once        Comments: Pre-op Diagnosis: Positive colorectal cancer screening using Cologuard test [R19.5]Procedure(s):XI ROBOTIC COLECTOMY, RIGHT Number of specimens: 1Name of specimens: 1. RIGHT COLON     Question:  Release to patient  Answer:  Immediate    02/21/25 1441                    [id number]

## 2025-02-21 NOTE — ASSESSMENT & PLAN NOTE
Possibly secondary to pain. No chest pain or shortness of breath    -Trend blood pressure and will administer PRN IV medications for blood pressure 160/100 or greater

## 2025-02-21 NOTE — H&P
"[facility] Services  History & Physical     Subjective:     Chief Complaint/Reason for Admission: colon mass      History of Present Illness:   Patient 69 y.o. male presents for colon resection.  Pt with known cecal mass.  At time of work up noted to have RP lymphadenopathy around prostte. Pt found to have prostate cancer with presumed mets to these node.  He is being seen by urology for this.  Plan to proceed with colon resection today         Prescriptions Prior to Admission[1]  Review of patient's allergies indicates:  No Known Allergies     Past Medical History:   Diagnosis Date    Borderline diabetes     Kidney stone     Pre-diabetes       Past Surgical History:   Procedure Laterality Date    COLONOSCOPY, SCREENING, HIGH RISK PATIENT N/A 12/23/2024    Procedure: COLONOSCOPY, SCREENING, HIGH RISK PATIENT;  Surgeon: Loco Orr DO;  Location: [facility] END[facility];  Service: Endoscopy;  Laterality: N/A;    TRANSRECTAL BIOPSY OF PROSTATE WITH ULTRASOUND GUIDANCE N/A 2/5/2025    Procedure: -uronav-BIOPSY, PROSTATE, RECTAL APPROACH, WITH US GUIDANCE (uploaded from [employer]);  Surgeon: Janeth Freire Jr., MD;  Location: [facility] OR;  Service: Urology;  Laterality: N/A;  Uronav prostate biopsy on 2/5/25.        Social History     Tobacco Use    Smoking status: Never     Passive exposure: Never    Smokeless tobacco: Never   Substance Use Topics    Alcohol use: Never        Review of Systems:  A comprehensive review of systems was negative.    OBJECTIVE:     Patient Vitals for the past 8 hrs:   BP Temp Temp src Pulse Resp SpO2 Height Weight   02/21/25 1029 (!) 170/83 98.8 °F (37.1 °C) Skin 88 20 100 % 5' 8.5" (1.74 m) 92.5 kg (203 lb 14.8 oz)     AAOx3  Soft/nd/nt  No resp distress    Data Review:      ASSESSMENT/PLAN:     There are no hospital problems to display for this patient.    Colon mass  Plan:  TO have resection today         [1]   Medications Prior to Admission   Medication Sig Dispense " Refill Last Dose/Taking    bicalutamide (CASODEX) 50 MG Tab Take 1 tablet (50 mg total) by mouth once daily. 30 tablet 1 2/20/2025    neomycin (MYCIFRADIN) 500 mg Tab On the day before surgery Take 2 tablets at noon, then 2 tablets at 2pm, then 2 tablets at 10pm. 6 tablet 0 2/20/2025    neomycin (MYCIFRADIN) 500 mg Tab The day before surgery, take 2 tablets at noon, then 2 tablets at 2pm, then 2 tablets at 10pm. 6 tablet 0 2/20/2025    ciprofloxacin HCl (CIPRO) 500 MG tablet Take 500 mg by mouth 2 (two) times daily.       diazePAM (VALIUM) 10 MG Tab Take 1 tablet (10 mg total) by mouth once. for 1 dose 1 tablet 0

## 2025-02-22 VITALS
HEIGHT: 69 IN | WEIGHT: 203.94 LBS | DIASTOLIC BLOOD PRESSURE: 58 MMHG | TEMPERATURE: 98 F | RESPIRATION RATE: 18 BRPM | BODY MASS INDEX: 30.21 KG/M2 | HEART RATE: 73 BPM | OXYGEN SATURATION: 96 % | SYSTOLIC BLOOD PRESSURE: 118 MMHG

## 2025-02-22 LAB
ANION GAP SERPL CALC-SCNC: 11 MMOL/L (ref 8–16)
BASOPHILS # BLD AUTO: 0.01 K/UL (ref 0–0.2)
BASOPHILS NFR BLD: 0.1 % (ref 0–1.9)
BUN SERPL-MCNC: 11 MG/DL (ref 8–23)
CALCIUM SERPL-MCNC: 7.9 MG/DL (ref 8.7–10.5)
CHLORIDE SERPL-SCNC: 106 MMOL/L (ref 95–110)
CO2 SERPL-SCNC: 22 MMOL/L (ref 23–29)
CREAT SERPL-MCNC: 1 MG/DL (ref 0.5–1.4)
DIFFERENTIAL METHOD BLD: ABNORMAL
EOSINOPHIL # BLD AUTO: 0 K/UL (ref 0–0.5)
EOSINOPHIL NFR BLD: 0 % (ref 0–8)
ERYTHROCYTE [DISTWIDTH] IN BLOOD BY AUTOMATED COUNT: 16.8 % (ref 11.5–14.5)
EST. GFR  (NO RACE VARIABLE): >60 ML/MIN/1.73 M^2
ESTIMATED AVG GLUCOSE: 146 MG/DL (ref 68–131)
GLUCOSE SERPL-MCNC: 158 MG/DL (ref 70–110)
HBA1C MFR BLD: 6.7 % (ref 4.5–6.2)
HCT VFR BLD AUTO: 37.1 % (ref 40–54)
HGB BLD-MCNC: 11.2 G/DL (ref 14–18)
IMM GRANULOCYTES # BLD AUTO: 0.05 K/UL (ref 0–0.04)
IMM GRANULOCYTES NFR BLD AUTO: 0.5 % (ref 0–0.5)
LYMPHOCYTES # BLD AUTO: 0.7 K/UL (ref 1–4.8)
LYMPHOCYTES NFR BLD: 6.5 % (ref 18–48)
MCH RBC QN AUTO: 23 PG (ref 27–31)
MCHC RBC AUTO-ENTMCNC: 30.2 G/DL (ref 32–36)
MCV RBC AUTO: 76 FL (ref 82–98)
MONOCYTES # BLD AUTO: 1 K/UL (ref 0.3–1)
MONOCYTES NFR BLD: 8.9 % (ref 4–15)
NEUTROPHILS # BLD AUTO: 9.2 K/UL (ref 1.8–7.7)
NEUTROPHILS NFR BLD: 84 % (ref 38–73)
NRBC BLD-RTO: 0 /100 WBC
PLATELET # BLD AUTO: 201 K/UL (ref 150–450)
PMV BLD AUTO: 10.4 FL (ref 9.2–12.9)
POTASSIUM SERPL-SCNC: 3.9 MMOL/L (ref 3.5–5.1)
RBC # BLD AUTO: 4.88 M/UL (ref 4.6–6.2)
SODIUM SERPL-SCNC: 139 MMOL/L (ref 136–145)
WBC # BLD AUTO: 10.92 K/UL (ref 3.9–12.7)

## 2025-02-22 PROCEDURE — 83036 HEMOGLOBIN GLYCOSYLATED A1C: CPT

## 2025-02-22 PROCEDURE — 36415 COLL VENOUS BLD VENIPUNCTURE: CPT

## 2025-02-22 PROCEDURE — 80048 BASIC METABOLIC PNL TOTAL CA: CPT | Performed by: SURGERY

## 2025-02-22 PROCEDURE — 63600175 PHARM REV CODE 636 W HCPCS: Mod: TB,JZ | Performed by: SURGERY

## 2025-02-22 PROCEDURE — 94799 UNLISTED PULMONARY SVC/PX: CPT

## 2025-02-22 PROCEDURE — 85025 COMPLETE CBC W/AUTO DIFF WBC: CPT | Performed by: SURGERY

## 2025-02-22 PROCEDURE — 94761 N-INVAS EAR/PLS OXIMETRY MLT: CPT

## 2025-02-22 PROCEDURE — 25000003 PHARM REV CODE 250: Performed by: SURGERY

## 2025-02-22 RX ORDER — OXYCODONE HYDROCHLORIDE 5 MG/1
5 TABLET ORAL EVERY 4 HOURS PRN
Qty: 15 TABLET | Refills: 0 | Status: SHIPPED | OUTPATIENT
Start: 2025-02-22 | End: 2025-02-27

## 2025-02-22 RX ORDER — METFORMIN HYDROCHLORIDE 500 MG/1
500 TABLET ORAL 2 TIMES DAILY WITH MEALS
Qty: 180 TABLET | Refills: 3 | Status: SHIPPED | OUTPATIENT
Start: 2025-02-22 | End: 2026-02-22

## 2025-02-22 RX ADMIN — HYDROMORPHONE HYDROCHLORIDE 1 MG: 1 INJECTION, SOLUTION INTRAMUSCULAR; INTRAVENOUS; SUBCUTANEOUS at 01:02

## 2025-02-22 RX ADMIN — IBUPROFEN 600 MG: 600 TABLET ORAL at 08:02

## 2025-02-22 RX ADMIN — ACETAMINOPHEN 1000 MG: 10 INJECTION INTRAVENOUS at 12:02

## 2025-02-22 RX ADMIN — IBUPROFEN 600 MG: 600 TABLET ORAL at 02:02

## 2025-02-22 RX ADMIN — ACETAMINOPHEN 1000 MG: 10 INJECTION INTRAVENOUS at 08:02

## 2025-02-22 RX ADMIN — ENOXAPARIN SODIUM 40 MG: 40 INJECTION SUBCUTANEOUS at 08:02

## 2025-02-22 RX ADMIN — SODIUM CHLORIDE, SODIUM LACTATE, POTASSIUM CHLORIDE, CALCIUM CHLORIDE AND DEXTROSE MONOHYDRATE: 5; 600; 310; 30; 20 INJECTION, SOLUTION INTRAVENOUS at 02:02

## 2025-02-22 RX ADMIN — GABAPENTIN 200 MG: 100 CAPSULE ORAL at 08:02

## 2025-02-22 RX ADMIN — MUPIROCIN 1 G: 20 OINTMENT TOPICAL at 08:02

## 2025-02-22 NOTE — CARE UPDATE
02/22/25 0744   Patient Assessment/Suction   Level of Consciousness (AVPU) alert   Respiratory Effort Unlabored   Expansion/Accessory Muscles/Retractions no use of accessory muscles   Rhythm/Pattern, Respiratory unlabored   PRE-TX-O2   Device (Oxygen Therapy) room air   SpO2 (!) 93 %   Pulse Oximetry Type Intermittent   $ Pulse Oximetry - Multiple Charge Pulse Oximetry - Multiple   Pulse 73   Resp 16   Incentive Spirometer   $ Incentive Spirometer Charges done with encouragement   Incentive Spirometer Predicted Level (mL) 7880   Administration (IS) mouthpiece utilized   Number of Repetitions (IS) 10   Level Incentive Spirometer (mL) 1250   Patient Tolerance (IS) no adverse signs/symptoms present

## 2025-02-22 NOTE — PLAN OF CARE
reviewed the chart and spoke with the Attending MD. Per Dr. Ron, the patient is now medically cleared to discharge home. In basket sent to Dr. Fuentes staff to request follow up apt. The patient's family will transport him home. There are no further CM needs.    The patient is cleared from a case management standpoint for discharge after tolerating diet and being cleared by Dr. Fuentes.       02/22/25 1205   Final Note   Assessment Type Final Discharge Note   Anticipated Discharge Disposition Home   Hospital Resources/Appts/Education Provided Appointments scheduled and added to AVS

## 2025-02-22 NOTE — OP NOTE
Of procedure: February 21, 2025     Staff surgeon: Dr. Alexis Fuentes     Preoperative diagnosis:  Cecal polyp     Postoperative diagnosis: The same     Procedure: Robotic right hemicolectomy     Anesthesia: General endotracheal anesthesia     Indication for procedure:  This is a 69-year-old gentleman who had recently had a screening colonoscopy.  A large flat cecal polyp was identified.  Biopsies were taken and pathology demonstrated fragments of tubular adenoma with high-grade dysplasia.  There was no obvious malignancy.  Patient had a CT scan of the chest abdomen and pelvis in which was noted to be a masslike density within the cecum.  Additionally there was iliac lymphadenopathy and enlarged prostate.  Patient underwent workup with Urology and was found to have prostate cancer.  Discussion with Urology in the retroperitoneal lymph nodes are felt to be secondary to metastatic prostate cancer.  He does however still have the cecal mass.  Decision and discussion was made to proceed with colonic resection.      Description of procedure:   Following signing informed consent he has taken the operating room placed supine position.  General endotracheal anesthesia was administered.  Both arms were tucked.  The abdomen is prepped and draped standard fashion.  Time-out procedure was performed.  Having performed time-out procedure I make a small transverse incision to the left of the umbilicus and dissect down to the deep dermal tissue.  The fascia was encountered in the Veress needle was used to enter the abdominal cavity uneventfully.  Pneumoperitoneum to 15 mm of mercury was established.  Next an 8 mm robotic trocar was placed under direct visualization.  The patient is placed in Trendelenburg with tilt to the left.  In the lower midline I make a transverse incision and placed an 8 mm trocar under direct visualization.  I placed an 8 mm trocar in the left upper quadrant.  Superior and lateral to this I placed a 12 mm  trocar under direct visualization.  All trocars were placed under direct visualization and after injection with Exparel mixed with Marcaine.  An assistant trocar 5 mm was placed in the left lower quadrant.  The robot is docked and the procedure was commenced.  I began by identifying the anatomy evaluate the liver no evidence of metastatic disease.  Terminal ileum was identified.  The cecum was grasped and held upright.  The ileocolic vessel was identified.  A scored the mesentery just distal to the takeoff.  I dissect into the retroperitoneum.  The duodenum was identified and swept posteriorly.  I continue to freed the mesentery of the ascending colon dissecting cephalad up towards the hepatic flexure.  A fully released these attachments and released the hepatic colic ligament without event.  Next a robotic vessel sealer was used to ligate the ileocolic vessel at its base.  I then ligate the mesentery of the ascending colon I continued cephalad up towards the mesentery to the proximal transverse colon.  I do take the right colic vessel preserve the right branch of the middle colic.  Next I freed the terminal ileum in the ascending colon from its retroperitoneal attachments as well as from the white line of Toldt.  I then pick a point of distal resection in the transverse colon.  Mesentery was ligated up to this point without event.  I then ligate the mesentery to the to the terminal ileum up to the point of proximal resection.  3 cc of ICG were given to the patient.  The proposed site of resection were noted to have good perfusion.  I staple across the colon uneventfully.  I then staple across the terminal ileum without event.  The resected specimen was then sent to the right upper quadrant over the liver.  Next I aligned the terminal ileum in the transverse colon in a side-to-side isoperistaltic fashion.  I make a colotomy a proximally 7 cm distal to the colon staple line.  An enterotomy was made a proximally 2 cm  proximal to the small bowel staple line.  The bowel was aligned in his stapled side-to-side anastomosis was created.  Staple line was evaluated noted to be hemostatic.  I then closed the colotomy with a barbed suture uneventfully.  This is done in 2 layers.  Vascularized omental pedicle flap was placed over this without event.  Next the areas irrigated with diluted Betadine.  This was then such it out.  Next I removed the 12 mm trocar and closed this site with a trans fascial suture uneventfully.  I then make a small transverse incision just above the pubis symphysis and dissect down through the deep dermal tissue down to the fascia.  I entered the abdominal cavity uneventfully.  I placed a Damian wound retractor.  I then bring the divided colon out through this site uneventfully.  There was a palpable lesion present in the cecum.  It felt rather soft uncertain if this is malignancy or just a large polyp.  I then closed the peritoneum.  The fascia was injected with Exparel mixed with Marcaine.  The fascia was then closed.  All trocar was removed uneventfully.  Skin incisions closed with 4-0 Monocryl.  Patient was extubated taken recovery room in stable condition.  Blood loss was 100 cc.  There were no immediate complications.

## 2025-02-22 NOTE — PLAN OF CARE
Problem: Adult Inpatient Plan of Care  Goal: Plan of Care Review  Outcome: Met  Goal: Patient-Specific Goal (Individualized)  Outcome: Met  Goal: Absence of Hospital-Acquired Illness or Injury  Outcome: Met  Goal: Optimal Comfort and Wellbeing  Outcome: Met  Goal: Readiness for Transition of Care  Outcome: Met     Problem: Infection  Goal: Absence of Infection Signs and Symptoms  Outcome: Met     Problem: Wound  Goal: Optimal Coping  Outcome: Met  Goal: Optimal Functional Ability  Outcome: Met  Goal: Absence of Infection Signs and Symptoms  Outcome: Met  Goal: Improved Oral Intake  Outcome: Met  Goal: Optimal Pain Control and Function  Outcome: Met  Goal: Skin Health and Integrity  Outcome: Met  Goal: Optimal Wound Healing  Outcome: Met     Problem: Fall Injury Risk  Goal: Absence of Fall and Fall-Related Injury  Outcome: Met   Discharge instructions given to patient.  Patient verbalized complete understanding.  IV discontinued with catheter intact, pressure applied to site and secured with tape.  Patient tolerated well.

## 2025-02-22 NOTE — NURSING
Hallman catheter discontinued at this time, tolerated well.  Pt given urinal and instructed on voiding trials. Verbalized understanding.

## 2025-02-22 NOTE — PROGRESS NOTES
POD 1 s/p robotic R franklin  Pt doing well  Reports flatus and BM  Pain well controlled  TOlerating clears    Wt Readings from Last 3 Encounters:   02/21/25 92.5 kg (203 lb 14.8 oz)   02/20/25 92.5 kg (204 lb)   02/03/25 92.5 kg (203 lb 14.8 oz)     Temp Readings from Last 3 Encounters:   02/22/25 98.3 °F (36.8 °C) (Oral)   02/05/25 97.8 °F (36.6 °C) (Skin)   01/07/25 98.5 °F (36.9 °C) (Oral)     BP Readings from Last 3 Encounters:   02/22/25 (!) 109/58   02/05/25 (!) 158/80   01/07/25 (!) 159/77     Pulse Readings from Last 3 Encounters:   02/22/25 73   02/05/25 89   01/07/25 97     AAOx3  Sinus  Soft/nd/appt ttp  BS present    Lab Results   Component Value Date    WBC 10.92 02/22/2025    HGB 11.2 (L) 02/22/2025    HCT 37.1 (L) 02/22/2025    MCV 76 (L) 02/22/2025     02/22/2025       BMP  Lab Results   Component Value Date     02/22/2025    K 3.9 02/22/2025     02/22/2025    CO2 22 (L) 02/22/2025    BUN 11 02/22/2025    CREATININE 1.0 02/22/2025    CALCIUM 7.9 (L) 02/22/2025    ANIONGAP 11 02/22/2025    EGFRNORACEVR >60 02/22/2025     A/P s/p robotic R franklin  Doing well  Adv diet as tolerated  If tolerates diet advancement ok to d/c home later today

## 2025-02-22 NOTE — PLAN OF CARE
On license of [facility] - Med/Surg  Initial Discharge Assessment       Primary Care Provider: Prem Iyer MD    Admission Diagnosis: Positive colorectal cancer screening using Cologuard test [R19.5]  Preop testing [Z01.818]    Admission Date: 2/21/2025  Expected Discharge Date: 2/22/2025     met with the patient and his sig other, Taylor, at the bedside to complete the discharge assessment plan. Pt alert and oriented X 3. Demographics and PCP verified. PCP: Dr. Prem Iyer- last saw Nov 2024. He lives with Taylor in a single story home. He still work full time, drive, and remain independent in all ADLs. DME: BP cuff. He denies HHC, Coumadin, and HD. Does not have an Advance Directive, Taylor is his surrogate. Pharmacy: [facility] on [facility]. Anticipated Dispo is home and his daughter or grand daughter will drive him home at DC. CM will continue to follow and assess DC needs.    Transition of Care Barriers: None    Payor: MEDICARE / Plan: MEDICARE PART A & B / Product Type: Government /     Extended Emergency Contact Information  Primary Emergency Contact: TerenceTaylor rodriguez  Mobile Phone: [phone]  Relation: Friend  Preferred language: English   needed? No  Secondary Emergency Contact: Matty Pratt  Mobile Phone: [phone]  Relation: Daughter  Preferred language: English   needed? No    Discharge Plan A: Home  Discharge Plan B: Home with family      [facility] [address] [place] [address] AT [facility] & [address] [address]  Phone: [phone] Fax: [phone]      Initial Assessment (most recent)       Adult Discharge Assessment - 02/22/25 1000          Discharge Assessment    Assessment Type Discharge Planning Assessment     Confirmed/corrected address, phone number and insurance Yes     Confirmed Demographics Correct on Facesheet     Source of Information patient     When was your last doctors appointment?  11/22/24     Does patient/caregiver understand observation status Yes     Communicated ZHANE with patient/caregiver Yes     Reason For Admission S/P right colectomy     People in Home significant other     Facility Arrived From: Home     Do you expect to return to your current living situation? Yes     Do you have help at home or someone to help you manage your care at home? Yes     Who are your caregiver(s) and their phone number(s)? Taylor Pratt (ARON) [phone]     Prior to hospitilization cognitive status: Alert/Oriented     Current cognitive status: Alert/Oriented     Walking or Climbing Stairs Difficulty no     Dressing/Bathing Difficulty no     Home Layout Able to live on 1st floor     Equipment Currently Used at Home blood pressure machine     Readmission within 30 days? No     Patient currently being followed by outpatient case management? No     Do you currently have service(s) that help you manage your care at home? No     Do you take prescription medications? Yes     Do you have prescription coverage? Yes     Do you have any problems affording any of your prescribed medications? No     Is the patient taking medications as prescribed? yes     Who is going to help you get home at discharge? Family     How do you get to doctors appointments? car, drives self     Are you on dialysis? No     Do you take coumadin? No     Discharge Plan A Home     Discharge Plan B Home with family     DME Needed Upon Discharge  none     Discharge Plan discussed with: Patient     Transition of Care Barriers None

## 2025-02-23 NOTE — HOSPITAL COURSE
Patient had  Robotic right hemicolectomy uneventfully with Dr. Fuentes, patient was monitored overnight,  tolerated diet well, passing gas.  Cleared for discharge.  Need follow up General surgery outpatient.   Patient also has diabetes, new diagnosed, started on metformin.

## 2025-02-23 NOTE — DISCHARGE SUMMARY
[facility] Medicine  Discharge Summary      Patient Name: Ju Barraza  MRN: [id number]  DOC: [id number]  Patient Class: IP- Inpatient  Admission Date: 2/21/2025  Hospital Length of Stay: 1 days  Discharge Date and Time: 2/22/2025  3:27 PM  Attending Physician: Mayda att. providers found   Discharging Provider: Gianfranco Ron MD  Primary Care Provider: Prem Iyer MD    Primary Care Team: Networked reference to record PCT     HPI:   Ju Barraza is a 69 year old male with a previous medical history of kidney stones and erectile dysfunction who is POD 0 for right colectomy with Dr. Fuentes due to mass in cecum. The patient underwent routine colonoscopy for a positive Cologuard. Biopsy of a cecal mass showed tubular adenoma with high grade dysplasia on 12/23/24. Subsequent CT chest abdomen pelvis with contrast on 12/30/24 with a 4.6 cm cecal mass highly suspicious for primary colon neoplasm and with enlarged right retroperitoneal lymph nodes consistent with neoplastic nodes.  There was also a enlarged prostate gland and with enlarged retroperitoneal nodes. He was referred to Dr. Fuentes with general surgery for colon mass and also to Dr. Freire with urology for elevated PSA. Dr. Freire performed a prostate biopsy and it was confirmed prostate cancer. Dr. Freire is following outpatient and prescribed eight weeks of bicalutamide along with PET scan and referral to hematology/oncology per note from 2/21/25. The right robotic colectomy was uneventful and patient seen in room 306. Endorses generalized abdominal tenderness but denies any other adverse issues. Hospital medicine consulted for medical management during admission.        Procedure(s) (LRB):  XI ROBOTIC COLECTOMY, RIGHT (N/A)      Hospital Course:   Patient had  Robotic right hemicolectomy uneventfully with Dr. Fuentes, patient was monitored overnight,  tolerated diet well, passing gas.  Cleared for discharge.  Need follow up  "General surgery outpatient.   Patient also has diabetes, new diagnosed, started on metformin.      Goals of Care Treatment Preferences:  Code Status: Full Code      SDOH Screening:  The patient declined to be screened for utility difficulties, food insecurity, transport difficulties, housing insecurity, and interpersonal safety, so no concerns could be identified this admission.     Consults:     * S/P right colectomy  POD 0 s/p right colectomy with Dr. Fuentes  Continue to follow general surgery recommendations.  Needs aggressive incentive spirometry.  Follow hemoglobin and hematocrit closely.  Pain control with IV narcotics and antiemetics as needed.  Lovenox 40mg daily subQ for DVT prophylaxis per general surgery recommendations.   Clear Liquid diet  IV ancef and flagyl Q 8 hours for 2 doses Post-op        Elevated blood-pressure reading without diagnosis of hypertension  Possibly secondary to pain. No chest pain or shortness of breath    -Trend blood pressure and will administer PRN IV medications for blood pressure 160/100 or greater      Prostate cancer  Being Followed by Dr. Freire    Plans Per his note from 2/21/25  "- RX for Bicalutamide x 8 weeks today.   - RTC with the urology NP in 2 weeks for Lupron.   - Proceed with robotic colectomy tomorrow with Dr. Fuentes.   - PSMA PET CT on 3/12/25.   - Referral placed to heme onc"        Final Active Diagnoses:    Diagnosis Date Noted POA    PRINCIPAL PROBLEM:  S/P right colectomy [Z90.49] 02/21/2025 Not Applicable    Prostate cancer [C61] 02/21/2025 Yes    Elevated blood-pressure reading without diagnosis of hypertension [R03.0] 02/21/2025 Yes      Problems Resolved During this Admission:       Discharged Condition: stable    Disposition: Home or Self Care    Follow Up:   Follow-up Information       Prem Iyer MD. Schedule an appointment as soon as possible for a visit in 1 week(s).    Specialties: Family Medicine, Urgent Care  Contact information:  [address] [place] [address]  [phone]               Alexis Fuentes MD. Schedule an appointment as soon as possible for a visit in 1 week(s).    Specialties: General Surgery, Colon and Rectal Surgery, Surgery  Why: An appointment has been requested. If you do not receive a call within the next 1-3 days to schedule the appointment, please call the office to schedule.  Contact information:  [address]  [address]  [place] [address]  [phone]                           Patient Instructions:   No discharge procedures on file.    Significant Diagnostic Studies: Labs: CMP   Recent Labs   Lab 02/22/25  0445      K 3.9      CO2 22*   *   BUN 11   CREATININE 1.0   CALCIUM 7.9*   ANIONGAP 11    and CBC   Recent Labs   Lab 02/22/25  0446   WBC 10.92   HGB 11.2*   HCT 37.1*          Pending Diagnostic Studies:       Procedure Component Value Units Date/Time    Specimen to Pathology - Surgery [[id number]] Collected: 02/21/25 1441    Order Status: Sent Lab Status: No result     Specimen: Tissue            Medications:  Reconciled Home Medications:      Medication List        START taking these medications      metFORMIN 500 MG tablet  Commonly known as: GLUCOPHAGE  Take 1 tablet (500 mg total) by mouth 2 (two) times daily with meals.     oxyCODONE 5 MG immediate release tablet  Commonly known as: ROXICODONE  Take 1 tablet (5 mg total) by mouth every 4 (four) hours as needed.            CONTINUE taking these medications      bicalutamide 50 MG Tab  Commonly known as: CASODEX  Take 1 tablet (50 mg total) by mouth once daily.     * neomycin 500 mg Tab  Commonly known as: MYCIFRADIN  On the day before surgery Take 2 tablets at noon, then 2 tablets at 2pm, then 2 tablets at 10pm.     * neomycin 500 mg Tab  Commonly known as: MYCIFRADIN  The day before surgery, take 2 tablets at noon, then 2 tablets at 2pm, then 2 tablets at 10pm.           * This list has 2 medication(s) that are the same as  other medications prescribed for you. Read the directions carefully, and ask your doctor or other care provider to review them with you.                STOP taking these medications      ciprofloxacin HCl 500 MG tablet  Commonly known as: CIPRO     diazePAM 10 MG Tab  Commonly known as: VALIUM            No results found.- pulls last radiology orders    Indwelling Lines/Drains at time of discharge:   Lines/Drains/Airways       None                   Time spent on the discharge of patient: 35 minutes         Gianfranco Ron MD  Department of Hospital Medicine  [facility]/Surg

## 2025-02-24 ENCOUNTER — TELEPHONE (OUTPATIENT)
Dept: SURGERY | Facility: CLINIC | Age: 70
End: 2025-02-24
Payer: MEDICARE

## 2025-02-24 ENCOUNTER — TELEPHONE (OUTPATIENT)
Facility: CLINIC | Age: 70
End: 2025-02-24
Payer: MEDICARE

## 2025-02-24 NOTE — TELEPHONE ENCOUNTER
I called patient  to inform him that I scheduled him to see Dr. Fuentes on Wednesday, 2/26/25 @ 1pm in [place] and he said that he doesn't have transportation.  I rescheduled him to Tuesday, 2/25/25 @ 2pm in [place].  Parviz

## 2025-02-24 NOTE — NURSING
Oncology Navigation   Intake      Treatment                              Acuity      Follow Up  No follow-ups on file.     Attempted to contact patient per referral but got no answer. Left message on voicemail explaining reason for call and requested a return call.

## 2025-02-24 NOTE — TELEPHONE ENCOUNTER
----- Message from  Katherine sent at 2/22/2025 12:00 PM CST -----  Regarding: Hospital Follow Up  The patient will discharge home today from [facility]. Please call the patient to schedule a hospital post op follow up visit.

## 2025-02-25 ENCOUNTER — OFFICE VISIT (OUTPATIENT)
Dept: SURGERY | Facility: CLINIC | Age: 70
End: 2025-02-25
Payer: MEDICARE

## 2025-02-25 ENCOUNTER — TELEPHONE (OUTPATIENT)
Facility: CLINIC | Age: 70
End: 2025-02-25
Payer: MEDICARE

## 2025-02-25 VITALS
DIASTOLIC BLOOD PRESSURE: 78 MMHG | TEMPERATURE: 99 F | SYSTOLIC BLOOD PRESSURE: 145 MMHG | BODY MASS INDEX: 29.59 KG/M2 | HEIGHT: 69 IN | RESPIRATION RATE: 16 BRPM | WEIGHT: 199.75 LBS | HEART RATE: 101 BPM

## 2025-02-25 DIAGNOSIS — K63.89 COLONIC MASS: Primary | ICD-10-CM

## 2025-02-25 PROCEDURE — 99024 POSTOP FOLLOW-UP VISIT: CPT | Mod: POP,,, | Performed by: SURGERY

## 2025-02-25 PROCEDURE — 99213 OFFICE O/P EST LOW 20 MIN: CPT | Mod: PBBFAC,PN | Performed by: SURGERY

## 2025-02-25 PROCEDURE — 99999 PR PBB SHADOW E&M-EST. PATIENT-LVL III: CPT | Mod: PBBFAC,,, | Performed by: SURGERY

## 2025-02-25 NOTE — PROGRESS NOTES
Cc: post op    HPI: 69 y.o.  male  4 days s/p robotic R franklin.   Pt doing well.  Pain slowly improving. Appetite has been good     PE: AFVSS    AAOx3  CTA  Soft/NT/nd  Inc: c/d/i        Path: pending      A/P:   Pt doing well post surgery.   F/U with me in 2 weeks.

## 2025-02-28 ENCOUNTER — HOSPITAL ENCOUNTER (OUTPATIENT)
Dept: RADIOLOGY | Facility: HOSPITAL | Age: 70
Discharge: HOME | End: 2025-02-28
Attending: UROLOGY
Payer: MEDICARE

## 2025-02-28 ENCOUNTER — TELEPHONE (OUTPATIENT)
Facility: CLINIC | Age: 70
End: 2025-02-28
Payer: MEDICARE

## 2025-02-28 DIAGNOSIS — C61 PROSTATE CANCER: ICD-10-CM

## 2025-02-28 PROCEDURE — A9595 HC PIFLUFOLASTAT F-18, DX, PER 1 MCI: HCPCS | Mod: TB,PO | Performed by: UROLOGY

## 2025-02-28 PROCEDURE — 78815 PET IMAGE W/CT SKULL-THIGH: CPT | Mod: 26,PI,, | Performed by: RADIOLOGY

## 2025-02-28 PROCEDURE — 78815 PET IMAGE W/CT SKULL-THIGH: CPT | Mod: TC,PO

## 2025-02-28 RX ADMIN — PIFLUFOLASTAT F-18 9.8 MILLICURIE: 80 INJECTION INTRAVENOUS at 03:02

## 2025-02-28 NOTE — NURSING
Oncology Navigation   Intake      Treatment                              Acuity      Follow Up  No follow-ups on file.     Spoke with patient about scheduling his appt but patient was getting his PET CT done and requested I call him back.

## 2025-03-03 ENCOUNTER — TELEPHONE (OUTPATIENT)
Facility: CLINIC | Age: 70
End: 2025-03-03
Payer: MEDICARE

## 2025-03-03 NOTE — NURSING
Oncology Navigation   Intake  Cancer Type:   Type of Referral: External  Date of Referral: 02/24/25  Initial Nurse Navigator Contact: 02/28/25 (Left message multiple times)  Referral to Initial Contact Timeline (days): 4  Appointment Date: 03/06/25     Treatment                              Acuity      Follow Up  No follow-ups on file.     Spoke with patient about scheduling appt per referral. Patient verbalized understanding and agreement with appt time, date and location.

## 2025-03-06 ENCOUNTER — OFFICE VISIT (OUTPATIENT)
Dept: HEMATOLOGY/ONCOLOGY | Facility: CLINIC | Age: 70
End: 2025-03-06
Payer: MEDICARE

## 2025-03-06 VITALS
SYSTOLIC BLOOD PRESSURE: 144 MMHG | WEIGHT: 202.63 LBS | OXYGEN SATURATION: 99 % | RESPIRATION RATE: 18 BRPM | TEMPERATURE: 98 F | HEART RATE: 97 BPM | DIASTOLIC BLOOD PRESSURE: 80 MMHG | HEIGHT: 68 IN | BODY MASS INDEX: 30.71 KG/M2

## 2025-03-06 DIAGNOSIS — C61 PROSTATE CANCER: Primary | ICD-10-CM

## 2025-03-06 DIAGNOSIS — D12.6 HIGH GRADE DYSPLASIA IN COLONIC ADENOMA: ICD-10-CM

## 2025-03-06 DIAGNOSIS — D12.6 HIGH GRADE DYSPLASIA IN COLONIC ADENOMA: Primary | ICD-10-CM

## 2025-03-06 PROCEDURE — 99205 OFFICE O/P NEW HI 60 MIN: CPT | Mod: S$PBB,,, | Performed by: INTERNAL MEDICINE

## 2025-03-06 PROCEDURE — 99214 OFFICE O/P EST MOD 30 MIN: CPT | Mod: PBBFAC,PN | Performed by: INTERNAL MEDICINE

## 2025-03-06 PROCEDURE — 99999 PR PBB SHADOW E&M-EST. PATIENT-LVL IV: CPT | Mod: PBBFAC,,, | Performed by: INTERNAL MEDICINE

## 2025-03-06 RX ORDER — PREDNISONE 5 MG/1
5 TABLET ORAL DAILY
Qty: 30 TABLET | Refills: 5 | Status: SHIPPED | OUTPATIENT
Start: 2025-03-06

## 2025-03-06 RX ORDER — ABIRATERONE ACETATE 250 MG/1
1000 TABLET ORAL DAILY
Qty: 120 TABLET | Refills: 5 | Status: SHIPPED | OUTPATIENT
Start: 2025-03-06 | End: 2026-03-06

## 2025-03-06 RX ORDER — PREDNISONE 5 MG/1
5 TABLET ORAL DAILY
Qty: 30 TABLET | Refills: 5 | Status: SHIPPED | OUTPATIENT
Start: 2025-03-06 | End: 2025-03-06

## 2025-03-06 NOTE — PROGRESS NOTES
Service Date:  3/6/25    Chief Complaint: Prostate Cancer (NP)    Ju Barraza is a 69 y.o. male here with newly diagnosed prostate adenocarcinoma with a PSA as high as 251, [place] 4 + 4, with PSMA PET scan showing right inguinal and femoral lymph nodes with questionable involvement of portacaval lymph node and lung nodules which are not lighting up.  He is on Casodex.  Plans to have Lupron next week.  Followed with Dr. Freire.  Tolerating Casodex okay.  Here to discuss treatment options with me.    Review of Systems   Constitutional: Negative.    HENT: Negative.     Eyes: Negative.    Respiratory: Negative.     Cardiovascular: Negative.    Gastrointestinal: Negative.    Endocrine: Negative.    Genitourinary: Negative.    Musculoskeletal: Negative.    Integumentary:  Negative.   Neurological: Negative.    Hematological: Negative.    Psychiatric/Behavioral: Negative.          Current Outpatient Medications   Medication Instructions    abiraterone (ZYTIGA) 1,000 mg, Oral, Daily    bicalutamide (CASODEX) 50 mg, Oral, Daily    metFORMIN (GLUCOPHAGE) 500 mg, Oral, 2 times daily with meals    neomycin (MYCIFRADIN) 500 mg Tab On the day before surgery Take 2 tablets at noon, then 2 tablets at 2pm, then 2 tablets at 10pm.    neomycin (MYCIFRADIN) 500 mg Tab The day before surgery, take 2 tablets at noon, then 2 tablets at 2pm, then 2 tablets at 10pm.    predniSONE (DELTASONE) 5 mg, Oral, Daily        Past Medical History:   Diagnosis Date    Borderline diabetes     Kidney stone     Pre-diabetes         Past Surgical History:   Procedure Laterality Date    COLONOSCOPY, SCREENING, HIGH RISK PATIENT N/A 12/23/2024    Procedure: COLONOSCOPY, SCREENING, HIGH RISK PATIENT;  Surgeon: Loco Orr DO;  Location: [facility];  Service: Endoscopy;  Laterality: N/A;    TRANSRECTAL BIOPSY OF PROSTATE WITH ULTRASOUND GUIDANCE N/A 2/5/2025    Procedure: -uronav-BIOPSY, PROSTATE, RECTAL APPROACH, WITH US GUIDANCE (uploaded from  "[email or web address]);  Surgeon: Janeth Freire Jr., MD;  Location: [facility];  Service: Urology;  Laterality: N/A;  Uronav prostate biopsy on 2/5/25.    XI ROBOTIC COLECTOMY, RIGHT N/A 2/21/2025    Procedure: XI ROBOTIC COLECTOMY, RIGHT;  Surgeon: Alexis Fuentes MD;  Location: [facility] OR;  Service: General;  Laterality: N/A;        Family History   Problem Relation Name Age of Onset    No Known Problems Father      No Known Problems Mother         Social History[1]      Vitals:    03/06/25 1024   BP: (!) 144/80   Pulse: 97   Resp: 18   Temp: 98.1 °F (36.7 °C)        Physical Exam:  BP (!) 144/80 (BP Location: Right arm, Patient Position: Sitting)   Pulse 97   Temp 98.1 °F (36.7 °C) (Temporal)   Resp 18   Ht 5' 8" (1.727 m)   Wt 91.9 kg (202 lb 9.6 oz)   SpO2 99%   BMI 30.81 kg/m²     Physical Exam  Vitals and nursing note reviewed.   Constitutional:       Appearance: Normal appearance.   HENT:      Head: Normocephalic and atraumatic.      Nose: Nose normal.      Mouth/Throat:      Mouth: Mucous membranes are moist.      Pharynx: Oropharynx is clear.   Eyes:      Extraocular Movements: Extraocular movements intact.      Conjunctiva/sclera: Conjunctivae normal.   Cardiovascular:      Rate and Rhythm: Normal rate and regular rhythm.      Heart sounds: Normal heart sounds.   Pulmonary:      Effort: Pulmonary effort is normal.      Breath sounds: Normal breath sounds.   Abdominal:      General: Abdomen is flat. Bowel sounds are normal.      Palpations: Abdomen is soft.   Musculoskeletal:         General: Normal range of motion.      Cervical back: Normal range of motion and neck supple.   Skin:     General: Skin is warm and dry.   Neurological:      General: No focal deficit present.      Mental Status: He is alert and oriented to person, place, and time. Mental status is at baseline.   Psychiatric:         Mood and Affect: Mood normal.          Labs:  Lab Results   Component Value Date    WBC 10.92 02/22/2025    RBC " 4.88 02/22/2025    HGB 11.2 (L) 02/22/2025    HCT 37.1 (L) 02/22/2025    MCV 76 (L) 02/22/2025    MCH 23.0 (L) 02/22/2025    MCHC 30.2 (L) 02/22/2025    RDW 16.8 (H) 02/22/2025     02/22/2025    MPV 10.4 02/22/2025    GRAN 9.2 (H) 02/22/2025    GRAN 84.0 (H) 02/22/2025    LYMPH 0.7 (L) 02/22/2025    LYMPH 6.5 (L) 02/22/2025    MONO 1.0 02/22/2025    MONO 8.9 02/22/2025    EOS 0.0 02/22/2025    BASO 0.01 02/22/2025    EOSINOPHIL 0.0 02/22/2025    BASOPHIL 0.1 02/22/2025     Sodium   Date Value Ref Range Status   02/22/2025 139 136 - 145 mmol/L Final     Potassium   Date Value Ref Range Status   02/22/2025 3.9 3.5 - 5.1 mmol/L Final     Chloride   Date Value Ref Range Status   02/22/2025 106 95 - 110 mmol/L Final     CO2   Date Value Ref Range Status   02/22/2025 22 (L) 23 - 29 mmol/L Final     Glucose   Date Value Ref Range Status   02/22/2025 158 (H) 70 - 110 mg/dL Final     BUN   Date Value Ref Range Status   02/22/2025 11 8 - 23 mg/dL Final     Creatinine   Date Value Ref Range Status   02/22/2025 1.0 0.5 - 1.4 mg/dL Final     Calcium   Date Value Ref Range Status   02/22/2025 7.9 (L) 8.7 - 10.5 mg/dL Final     Total Protein   Date Value Ref Range Status   01/14/2025 7.1 6.0 - 8.4 g/dL Final     Albumin   Date Value Ref Range Status   01/14/2025 3.5 3.5 - 5.2 g/dL Final     Total Bilirubin   Date Value Ref Range Status   01/14/2025 0.3 0.1 - 1.0 mg/dL Final     Comment:     For infants and newborns, interpretation of results should be based  on gestational age, weight and in agreement with clinical  observations.    Premature Infant recommended reference ranges:  Up to 24 hours.............<8.0 mg/dL  Up to 48 hours............<12.0 mg/dL  3-5 days..................<15.0 mg/dL  6-29 days.................<15.0 mg/dL       Alkaline Phosphatase   Date Value Ref Range Status   01/14/2025 66 40 - 150 U/L Final     AST   Date Value Ref Range Status   01/14/2025 14 10 - 40 U/L Final     ALT   Date Value Ref Range  Status   01/14/2025 22 10 - 44 U/L Final     Anion Gap   Date Value Ref Range Status   02/22/2025 11 8 - 16 mmol/L Final       A/P:    Prostate adenocarcinoma   -[place] 4 + 4   -pretreatment    -currently on Casodex with plans to start Lupron next week with Urology Dr. Freire  -I will send in a prescription for abiraterone with prednisone as I believe he would best be treated with combination of Lupron, abiraterone, and radiation therapy for locally advanced prostate cancer, with plans to treat the Lupron and abiraterone for 2-3 years depending on what how well he does.  -I will refer him over to Radiation Oncology after I messaged Dr. Talbert, who feels that he would be a good candidate at this time  -called the patient and let him know about the referral as well as it was done after office visit  -return to clinic in 4 weeks, by then he should be on his Lupron and abiraterone, for toxicity check      Aurash Khoobehi, MD  Hematology and Oncology         [1]   Social History  Tobacco Use    Smoking status: Never     Passive exposure: Never    Smokeless tobacco: Never   Substance Use Topics    Alcohol use: Never    Drug use: Never

## 2025-03-07 ENCOUNTER — TELEPHONE (OUTPATIENT)
Dept: HEMATOLOGY/ONCOLOGY | Facility: CLINIC | Age: 70
End: 2025-03-07
Payer: MEDICARE

## 2025-03-07 NOTE — TELEPHONE ENCOUNTER
----- Message from Aurash Khoobehi, MD sent at 3/6/2025  4:17 PM CST -----  Thanks, I just called him and let him know yall will be reaching out. I'll put in the referral.  ----- Message -----  From: Kalia Talbert III, MD  Sent: 3/6/2025   2:09 PM CST  To: Patricia Breyel, RN; Ernestina Goodwin, RT; Esthela #    Yessir. Absolutely should have definitive RT. And I agree w/ ADT+Chelo too x2-3 years.  We now radiate prostates w/ limited DM also via STAMPEDE trial too fyi. Will get him in.  Thanks!  ----- Message -----  From: Khoobehi, Aurash, MD  Sent: 3/6/2025   1:46 PM CST  To: MD Eric French III ,When you get a chance, can you see if definitive radiation therapy would be of benefit for him?  Newly diagnosed prostate cancer [place] 4 + 4.  PSMA PET scan showed local lymph node Mets with right external iliac and common femoral lymph nodes.  There was a questionable portacaval lymph node and lung nodules that did not light up.  Assuming that these are not distant Mets, do you think radiation therapy would be appropriate and we could do it with ADT and abiraterone, which I am going to start him on anyway.ThanksAK

## 2025-03-11 ENCOUNTER — OFFICE VISIT (OUTPATIENT)
Dept: SURGERY | Facility: CLINIC | Age: 70
End: 2025-03-11
Payer: MEDICARE

## 2025-03-11 VITALS
HEIGHT: 68 IN | BODY MASS INDEX: 30.94 KG/M2 | HEART RATE: 87 BPM | SYSTOLIC BLOOD PRESSURE: 141 MMHG | DIASTOLIC BLOOD PRESSURE: 65 MMHG | RESPIRATION RATE: 16 BRPM | TEMPERATURE: 98 F | WEIGHT: 204.13 LBS

## 2025-03-11 DIAGNOSIS — D37.4 NEOPLASM OF UNCERTAIN BEHAVIOR OF COLON: Primary | ICD-10-CM

## 2025-03-11 PROCEDURE — 99213 OFFICE O/P EST LOW 20 MIN: CPT | Mod: PBBFAC,PN | Performed by: SURGERY

## 2025-03-11 PROCEDURE — 99999 PR PBB SHADOW E&M-EST. PATIENT-LVL III: CPT | Mod: PBBFAC,,, | Performed by: SURGERY

## 2025-03-11 PROCEDURE — 99024 POSTOP FOLLOW-UP VISIT: CPT | Mod: POP,,, | Performed by: SURGERY

## 2025-03-11 NOTE — PROGRESS NOTES
Cc: post op    HPI: 69 y.o.  male  2.5  weeks s/p robotic R franklin.   Pt doing well.  No n/v.  No fever/chlls.  Minimal pain.  Activity and appetite have improved.       PE: AFVSS    AAOx3  CTA  Soft/NT/nd  Inc: c/d/i        Path:   COLON, RIGHT, HEMICOLECTOMY:     -- TUBULAR ADENOMA WITH HIGH-GRADE DYSPLASIA.     -- NEGATIVE FOR INVASIVE CARCINOMA.     -- LESION SIZE: 5.7 CM IN GREATEST DIMENSION.     -- PRIOR BIOPSY PROCEDURE CHANGES PRESENT.     -- APPENDIX WITH NO SIGNIFICANT HISTOPATHOLOGIC ABNORMALITY.     -- SMALL TUBULAR ADENOMA (3 MM) AT ILEOCECAL VALVE.     -- TWENTY-NINE LYMPH NODES, NEGATIVE FOR CARCINOMA (0/29).     -- MARGINS NEGATIVE FOR DYSPLASIA OR MALIGNANCY.     A/P:   Pt doing well post surgery.   Will need repeat cscope in 1 year  F/U with me prn

## 2025-03-12 ENCOUNTER — TELEPHONE (OUTPATIENT)
Dept: UROLOGY | Facility: CLINIC | Age: 70
End: 2025-03-12
Payer: MEDICARE

## 2025-03-12 ENCOUNTER — RESULTS FOLLOW-UP (OUTPATIENT)
Dept: UROLOGY | Facility: CLINIC | Age: 70
End: 2025-03-12

## 2025-03-12 NOTE — TELEPHONE ENCOUNTER
----- Message from Caroline sent at 3/12/2025 12:40 PM CDT -----  Regarding: Needs return call  Type: Needs Medical AdviceWho Called:  Brett Call Back Number: [phone] Information: Pt states he needs to speak to the office regarding his medication and regarding him coming Friday to take.

## 2025-03-13 ENCOUNTER — SOCIAL WORK (OUTPATIENT)
Dept: HEMATOLOGY/ONCOLOGY | Facility: CLINIC | Age: 70
End: 2025-03-13
Payer: MEDICARE

## 2025-03-13 ENCOUNTER — OFFICE VISIT (OUTPATIENT)
Dept: RADIATION ONCOLOGY | Facility: CLINIC | Age: 70
End: 2025-03-13
Payer: MEDICARE

## 2025-03-13 VITALS
HEART RATE: 96 BPM | TEMPERATURE: 98 F | BODY MASS INDEX: 31.09 KG/M2 | OXYGEN SATURATION: 96 % | SYSTOLIC BLOOD PRESSURE: 167 MMHG | RESPIRATION RATE: 18 BRPM | DIASTOLIC BLOOD PRESSURE: 76 MMHG | WEIGHT: 204.5 LBS

## 2025-03-13 DIAGNOSIS — C61 PROSTATE CANCER: Primary | ICD-10-CM

## 2025-03-13 NOTE — PROGRESS NOTES
Ju Barraza  [id number]  1955  3/13/2025  [place], [place], Md  [address]  [address]  [place] [address]    Dx: Regionally metastatic PCa    HISTORY OF PRESENT ILLNESS:   Mr. Barraza is a 69M who was found with PSA rise to 251 and on subsequent MRI + TRUS w/ CNBx a L-PZ lesion was found to harbor Grp4 adenoCA.  Additionally imaging showed pelvic LAD.  Thus he was then referrred to [facility] and [facility] for eval/discussion re: tx options.    REVIEW OF SYSTEMS:  A complete ROS was performed and the patient denies any acute changes/concerns other than per HPI.    Past Medical History:   Diagnosis Date    Borderline diabetes     Kidney stone     Pre-diabetes      Past Surgical History:   Procedure Laterality Date    COLONOSCOPY, SCREENING, HIGH RISK PATIENT N/A 12/23/2024    Procedure: COLONOSCOPY, SCREENING, HIGH RISK PATIENT;  Surgeon: Loco Orr DO;  Location: [facility] ENDO;  Service: Endoscopy;  Laterality: N/A;    TRANSRECTAL BIOPSY OF PROSTATE WITH ULTRASOUND GUIDANCE N/A 2/5/2025    Procedure: -uronav-BIOPSY, PROSTATE, RECTAL APPROACH, WITH US GUIDANCE (uploaded from [email or web address]);  Surgeon: Janeth Freire Jr., MD;  Location: [facility] ASU OR;  Service: Urology;  Laterality: N/A;  Uronav prostate biopsy on 2/5/25.    XI ROBOTIC COLECTOMY, RIGHT N/A 2/21/2025    Procedure: XI ROBOTIC COLECTOMY, RIGHT;  Surgeon: Alexis Fuentes MD;  Location: [facility] OR;  Service: General;  Laterality: N/A;     Social History     Socioeconomic History    Marital status: Single   Tobacco Use    Smoking status: Never     Passive exposure: Never    Smokeless tobacco: Never   Substance and Sexual Activity    Alcohol use: Never    Drug use: Never    Sexual activity: Yes     Partners: Female     Comment:      Social Drivers of Health     Financial Resource Strain: Patient Declined (2/21/2025)    Overall Financial Resource Strain (CARDIA)     Difficulty of Paying Living Expenses: Patient declined   Food Insecurity: Patient  Declined (2/21/2025)    Hunger Vital Sign     Worried About Running Out of Food in the Last Year: Patient declined     Ran Out of Food in the Last Year: Patient declined   Stress: Patient Declined (2/21/2025)    [employer] - Occupational Stress Questionnaire     Feeling of Stress : Patient declined   Housing Stability: Patient Declined (2/21/2025)    Housing Stability Vital Sign     Unable to Pay for Housing in the Last Year: Patient declined     Homeless in the Last Year: Patient declined     Family History   Problem Relation Name Age of Onset    No Known Problems Father      No Known Problems Mother         PRIOR HISTORY OF CHEMOTHERAPY OR RADIOTHERAPY: Please see HPI for patients prior oncologic history.    Medication List with Changes/Refills   Current Medications    ABIRATERONE (ZYTIGA) 250 MG TAB    Take 4 tablets (1,000 mg total) by mouth once daily.    METFORMIN (GLUCOPHAGE) 500 MG TABLET    Take 1 tablet (500 mg total) by mouth 2 (two) times daily with meals.    PREDNISONE (DELTASONE) 5 MG TABLET    Take 1 tablet (5 mg total) by mouth once daily.     Review of patient's allergies indicates:  No Known Allergies    QUALITY OF LIFE: 100%- Normal, No Complaints, No Evidence of Disease    There were no vitals filed for this visit.  There is no height or weight on file to calculate BMI.    PHYSICAL EXAM:  GENERAL: alert; in no apparent distress.   HEAD: normocephalic, atraumatic.  EYES: pupils are equal, round, reactive to light and accommodation. Sclera anicteric. Conjunctiva not injected.   NOSE/THROAT: no nasal erythema or rhinorrhea. Oropharynx pink, without erythema, ulcerations or thrush.   NECK: no cervical motion rigidity; supple with no masses.  CHEST: clear to auscultation bilaterally; no wheezes, crackles or rubs. Patient is speaking comfortably on room air with normal work of breathing without using accessory muscles of respiration.  CARDIOVASCULAR: regular rate and  rhythm; no murmurs, rubs or gallops.  ABDOMEN: soft, nontender, nondistended. Bowel sounds present.   MUSCULOSKELETAL: no tenderness to palpation along the spine or scapulae. Normal range of motion.  NEUROLOGIC: cranial nerves II-XII intact bilaterally. Strength 5/5 in bilateral upper and lower extremities. No sensory deficits appreciated. Reflexes globally intact. No cerebellar signs. Normal gait.  LYMPHATIC: no cervical, supraclavicular or axillary adenopathy appreciated bilaterally.   EXTREMITIES: no clubbing, cyanosis, edema.  SKIN: no erythema, rashes, ulcerations noted.     REVIEW OF IMAGING/PATHOLOGY/LABS: Please see HPI. All images reviewed personally by dictating physician.       ASSESSMENT: Ju Barraza is a 69 y.o. male with Regionally metastatic PCa    PLAN:  After complete review of the patient's chart/hx and eval/discussion w/ the patient today, I explained that definitive IMRT to his prostate, SV, and pelvic w/ LAD boosting, along w/ ADT+Chelo/pred (x2-3yrs) is recommended at this time.     We reviewed and discussed these options, with regard to his age, life expectancy, and comorbidities, along with the rationales, risks, benefits, alternatives (ADT alone or no tx at all), and expected outcomes. I also explained the differing details in each of these standard therapys toxicity risks. If deemed medically operable and safe for anesthesia, then either of these options are appropriate for this patient  It was also highlighted that RT may possibly be indicated after RP, and the possible reasons for this, such as (+)SM/KARINA/SVI,  were discussed.     We then further discussed RT via IMRT wherein  I reviewed in detail the indications, differences, and potential toxicities, including but not limited to fatigue, skin irritation/erythema/desquamation, pain, worsening of dysuria, diarrhea, irritation of hemorrhoids, acute/chronic proctitis leading to rectal bleeding/discomfort and possible need for procedural  intervention, cystitis with potential for perforation/fistulization requiring procedure intervention, erectile dysfunction, sterility, late urethral narrowing and/or stricture causing urinary retention and requiring procedural dilation or catheterization, increased risk of hip fracture, and secondary malignancy.  I carefully explained the process of simulation and treatment delivery with weekly physician visits.     It was also discussed that, for optimal delivery of IMRT, fiducial markers would need to be placed in his prostate gland for IGRT prior to initiating RT, which could also be accompanied by implantation of rectal spacing gel for distancing of his rectum further from the gland and high-dose radiation region.     The patient verbalized understanding of the above plan and risks, and questions were answered fully and appropriately.  Ultimately it was decided that the patient would most prefer to undergo definitive IMRT with concurrent/adj ADT.  He would also like to proceed with fiducial and rectal spacing gel placement.      He will now return to Urology for initiation of ADT as well as spacing gel & fiducial marker placement, to be followed by RT planning.  RT dose/regimen will be determined in planning.    DISPOSITION: RTC FOR CT SIM    I have personally seen and evaluated this patient. Greater than 50% of this time was spent discussing coordination of care and/or counseling.    PHYSICIAN: Kalia Talbert III, MD    Thank you for the opportunity to meet and consult with Ju Barraza.   Please feel free to contact me to discuss the above recommendation further.

## 2025-03-14 ENCOUNTER — OFFICE VISIT (OUTPATIENT)
Dept: UROLOGY | Facility: CLINIC | Age: 70
End: 2025-03-14
Payer: MEDICARE

## 2025-03-14 DIAGNOSIS — C61 PROSTATE CANCER: Primary | ICD-10-CM

## 2025-03-14 DIAGNOSIS — R97.20 ELEVATED PSA: ICD-10-CM

## 2025-03-14 PROCEDURE — 99999 PR PBB SHADOW E&M-EST. PATIENT-LVL III: CPT | Mod: PBBFAC,,, | Performed by: NURSE PRACTITIONER

## 2025-03-14 PROCEDURE — 99213 OFFICE O/P EST LOW 20 MIN: CPT | Mod: PBBFAC,PO | Performed by: NURSE PRACTITIONER

## 2025-03-14 NOTE — PROGRESS NOTES
[facility] Note  Staff: JEANINE Bhatt    PCP: MELE Iyer  :  MELE Freire    Chief Complaint: Lupron Injection/Prostate CA    Subjective:        HPI: Ju Barraza is a 69 y.o. male who presents for high-risk prostate cancer.     Patient with a history of kidney stones who underwent routine colonoscopy for a positive Cologuard. 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      CT chest abdomen pelvis with contrast on 24 with a 4.6 cm cecal mass highly suspicious for primary colon neoplasm and with enlarged right retroperitoneal lymph nodes consistent with neoplastic nodes. Index nodes detailed above. There is also a enlarged prostate gland and with enlarged retroperitoneal nodes also recommend correlation clinically and with PSA.      Referred to Dr. Fuentes with general surgery for management of his cecal mass. However, his PSA was found to be significantly elevated at 251. Referred to urology for evaluation. Has never had his PSA tested prior.      MRI prostate on 25 with a moderately enlarged 52 cc prostate gland, demonstrating a subcentimeter PI-RADS 4 finding within the left peripheral zone, and diffuse PI-RADS 3 findings throughout the transition zone. Right iliac chain adenopathy concerning for padmini spread of disease. Consider PSMA PET-CT for further evaluation.      He has no significant lower urinary tract symptoms. He has erectile dysfunction. Tried Viagra several years ago, but stopped due to a headache.      Works as a . Brother with a history of pancreatic cancer. Sister  from cancer as well. Never smoked.      Interval History  25: He is now s/p Uronav fusion prostate biopsy on 25. Pathology with [place] 4+4=8 prostate cancer in 1 core. Here today for follow up. PSMA PET CT pending. Of note, he is scheduled for robotic colectomy with Dr. Fuentes tomorrow.     3/14/25:  Pt arrives today  for Lupron Injection #1  Pt overall doing well today, no complaints voiced.    Pt was evaluated by Hem/Onc-Dr. Khoobehi yesterday.  He currently denies dysuria, gross hematuria, fever, and/or problems with urination at this time.  Lupron medication information reviewed with pt upon arrival today including risks and benefits.  All questions answered, therefore pt would like to proceed at this time.    Procedure Note:  Lupron 45 mg IM administered injection to pt's Right dorsogluteal by me during ov today.  Pt tolerated procedure well.      PSA  251                  1/14/25     IPSSQoL  31          1/29/25    REVIEW OF SYSTEMS:  A comprehensive 10 system review was performed and is negative except as noted above in HPI    PMHx:  Past Medical History:   Diagnosis Date    Borderline diabetes     Kidney stone     Pre-diabetes      PSHx:  Past Surgical History:   Procedure Laterality Date    COLONOSCOPY, SCREENING, HIGH RISK PATIENT N/A 12/23/2024    Procedure: COLONOSCOPY, SCREENING, HIGH RISK PATIENT;  Surgeon: Loco Orr DO;  Location: [facility] ENDO;  Service: Endoscopy;  Laterality: N/A;    TRANSRECTAL BIOPSY OF PROSTATE WITH ULTRASOUND GUIDANCE N/A 2/5/2025    Procedure: -uronav-BIOPSY, PROSTATE, RECTAL APPROACH, WITH US GUIDANCE (uploaded from [email or web address]);  Surgeon: Janeth Freire Jr., MD;  Location: [facility] ASU OR;  Service: Urology;  Laterality: N/A;  Uronav prostate biopsy on 2/5/25.    XI ROBOTIC COLECTOMY, RIGHT N/A 2/21/2025    Procedure: XI ROBOTIC COLECTOMY, RIGHT;  Surgeon: Alexis Fuentes MD;  Location: [facility] OR;  Service: General;  Laterality: N/A;       Allergies:  Patient has no known allergies.    Medications: reviewed     Objective:   There were no vitals filed for this visit.    General:WDWN in NAD  Eyes: PERRLA, normal conjunctiva  Respiratory: no increased work on breathing, clear to auscultation  Cardiovascular: regular rate and rhythm. No obvious extremity edema.  GI: palpation of masses.  No tenderness. No hepatosplenomegaly to palpation.  Musculoskeletal: normal range of motion of bilateral upper extremities. Normal muscle strength and tone.  Skin: no obvious rashes or lesions. No tightening of skin noted.  Neurologic: CN grossly normal. Normal sensation.   Psychiatric: awake, alert and oriented x 3. Mood and affect normal. Cooperative.    Assessment:       1. Prostate cancer    2. Elevated PSA          Plan:     Medication information-benefits, risks and side affects thoroughly reviewed with pt during office visit today with all questions answered.    F/u in six months or otherwise indicated by Hem/Onc in the near future.      SEBAS MartinezP-C  Visit today is associated with current or anticipated ongoing medical care related to this patient's single serious condition/complex condition.

## 2025-03-18 ENCOUNTER — TELEPHONE (OUTPATIENT)
Dept: UROLOGY | Facility: CLINIC | Age: 70
End: 2025-03-18
Payer: MEDICARE

## 2025-03-18 NOTE — TELEPHONE ENCOUNTER
----- Message from Janeth Freire MD sent at 3/14/2025  3:33 PM CDT -----  Please schedule virtual audio with me at the end of April to schedule rectal spacer gel and markers. Thanks.

## 2025-04-01 ENCOUNTER — LAB VISIT (OUTPATIENT)
Dept: LAB | Facility: HOSPITAL | Age: 70
End: 2025-04-01
Attending: SURGERY
Payer: MEDICARE

## 2025-04-01 DIAGNOSIS — D12.6 HIGH GRADE DYSPLASIA IN COLONIC ADENOMA: ICD-10-CM

## 2025-04-01 LAB
ABSOLUTE EOSINOPHIL (SMH): 0.07 K/UL
ABSOLUTE MONOCYTE (SMH): 0.5 K/UL (ref 0.3–1)
ABSOLUTE NEUTROPHIL COUNT (SMH): 5.3 K/UL (ref 1.8–7.7)
ALBUMIN SERPL-MCNC: 4.1 G/DL (ref 3.5–5.2)
ALP SERPL-CCNC: 79 UNIT/L (ref 55–135)
ALT SERPL-CCNC: 15 UNIT/L (ref 10–44)
ANION GAP (SMH): 7 MMOL/L (ref 8–16)
AST SERPL-CCNC: 12 UNIT/L (ref 10–40)
BASOPHILS # BLD AUTO: 0.02 K/UL
BASOPHILS NFR BLD AUTO: 0.3 %
BILIRUB SERPL-MCNC: 0.3 MG/DL (ref 0.1–1)
BUN SERPL-MCNC: 18 MG/DL (ref 8–23)
CALCIUM SERPL-MCNC: 9.3 MG/DL (ref 8.7–10.5)
CHLORIDE SERPL-SCNC: 106 MMOL/L (ref 95–110)
CO2 SERPL-SCNC: 27 MMOL/L (ref 23–29)
CREAT SERPL-MCNC: 0.9 MG/DL (ref 0.5–1.4)
ERYTHROCYTE [DISTWIDTH] IN BLOOD BY AUTOMATED COUNT: 17.9 % (ref 11.5–14.5)
FERRITIN SERPL-MCNC: 23.5 NG/ML (ref 20–300)
GFR SERPLBLD CREATININE-BSD FMLA CKD-EPI: >60 ML/MIN/1.73/M2
GLUCOSE SERPL-MCNC: 172 MG/DL (ref 70–110)
HCT VFR BLD AUTO: 40 % (ref 40–54)
HGB BLD-MCNC: 12 GM/DL (ref 14–18)
IMM GRANULOCYTES # BLD AUTO: 0.04 K/UL (ref 0–0.04)
IMM GRANULOCYTES NFR BLD AUTO: 0.6 % (ref 0–0.5)
IRON SATN MFR SERPL: 11 % (ref 20–50)
IRON SERPL-MCNC: 47 UG/DL (ref 45–160)
LYMPHOCYTES # BLD AUTO: 1.23 K/UL (ref 1–4.8)
MCH RBC QN AUTO: 23 PG (ref 27–31)
MCHC RBC AUTO-ENTMCNC: 30 G/DL (ref 32–36)
MCV RBC AUTO: 77 FL (ref 82–98)
NUCLEATED RBC (/100WBC) (SMH): 0 /100 WBC
PLATELET # BLD AUTO: 183 K/UL (ref 150–450)
PMV BLD AUTO: 10.2 FL (ref 9.2–12.9)
POTASSIUM SERPL-SCNC: 4.4 MMOL/L (ref 3.5–5.1)
PROT SERPL-MCNC: 7.3 GM/DL (ref 6–8.4)
RBC # BLD AUTO: 5.21 M/UL (ref 4.6–6.2)
RELATIVE EOSINOPHIL (SMH): 1 % (ref 0–8)
RELATIVE LYMPHOCYTE (SMH): 17.1 % (ref 18–48)
RELATIVE MONOCYTE (SMH): 7 % (ref 4–15)
RELATIVE NEUTROPHIL (SMH): 74 % (ref 38–73)
SODIUM SERPL-SCNC: 140 MMOL/L (ref 136–145)
TIBC SERPL-MCNC: 419 UG/DL (ref 250–450)
TRANSFERRIN SERPL-MCNC: 299 MG/DL (ref 200–375)
WBC # BLD AUTO: 7.18 K/UL (ref 3.9–12.7)

## 2025-04-01 PROCEDURE — 36415 COLL VENOUS BLD VENIPUNCTURE: CPT

## 2025-04-01 PROCEDURE — 85025 COMPLETE CBC W/AUTO DIFF WBC: CPT

## 2025-04-01 PROCEDURE — 82040 ASSAY OF SERUM ALBUMIN: CPT

## 2025-04-01 PROCEDURE — 83540 ASSAY OF IRON: CPT

## 2025-04-01 PROCEDURE — 82728 ASSAY OF FERRITIN: CPT

## 2025-04-03 ENCOUNTER — OFFICE VISIT (OUTPATIENT)
Dept: HEMATOLOGY/ONCOLOGY | Facility: CLINIC | Age: 70
End: 2025-04-03
Payer: MEDICARE

## 2025-04-03 VITALS
TEMPERATURE: 98 F | OXYGEN SATURATION: 98 % | RESPIRATION RATE: 18 BRPM | BODY MASS INDEX: 31.17 KG/M2 | HEART RATE: 86 BPM | SYSTOLIC BLOOD PRESSURE: 137 MMHG | DIASTOLIC BLOOD PRESSURE: 80 MMHG | WEIGHT: 205.69 LBS | HEIGHT: 68 IN

## 2025-04-03 DIAGNOSIS — E61.1 IRON DEFICIENCY: ICD-10-CM

## 2025-04-03 DIAGNOSIS — C61 PROSTATE CANCER: Primary | ICD-10-CM

## 2025-04-03 DIAGNOSIS — D12.6 HIGH GRADE DYSPLASIA IN COLONIC ADENOMA: ICD-10-CM

## 2025-04-03 PROCEDURE — 99214 OFFICE O/P EST MOD 30 MIN: CPT | Mod: S$PBB,,, | Performed by: INTERNAL MEDICINE

## 2025-04-03 PROCEDURE — 99213 OFFICE O/P EST LOW 20 MIN: CPT | Mod: PBBFAC,PN | Performed by: INTERNAL MEDICINE

## 2025-04-03 PROCEDURE — 99999 PR PBB SHADOW E&M-EST. PATIENT-LVL III: CPT | Mod: PBBFAC,,, | Performed by: INTERNAL MEDICINE

## 2025-04-03 RX ORDER — FERROUS SULFATE 325(65) MG
325 TABLET ORAL DAILY
Qty: 30 TABLET | Refills: 3 | Status: SHIPPED | OUTPATIENT
Start: 2025-04-03 | End: 2026-04-03

## 2025-04-03 NOTE — PROGRESS NOTES
Service Date:  4/3/25    Chief Complaint: Prostate Cancer (Labs  4 week follow up )    Ju Barraza is a 69 y.o. male here with newly diagnosed prostate adenocarcinoma with a PSA as high as 251, Loren 4 + 4, with PSMA PET scan showing right inguinal and femoral lymph nodes with questionable involvement of portacaval lymph node and lung nodules which are not lighting up.      Has now been on abiraterone.  Also started on Lupron.  Takes prednisone with the abiraterone.  States he is tolerating it well.  Has not noticed any difference in his mood or energy level.  Feels that the prednisone actually gives him energy.  Wants to get back to the gym.    Review of Systems   Constitutional: Negative.    HENT: Negative.     Eyes: Negative.    Respiratory: Negative.     Cardiovascular: Negative.    Gastrointestinal: Negative.    Endocrine: Negative.    Genitourinary: Negative.    Musculoskeletal: Negative.    Integumentary:  Negative.   Neurological: Negative.    Hematological: Negative.    Psychiatric/Behavioral: Negative.          Current Outpatient Medications   Medication Instructions    abiraterone (ZYTIGA) 1,000 mg, Oral, Daily    ferrous sulfate (FEOSOL) 325 mg, Oral, Daily    metFORMIN (GLUCOPHAGE) 500 mg, Oral, 2 times daily with meals    predniSONE (DELTASONE) 5 mg, Oral, Daily        Past Medical History:   Diagnosis Date    Borderline diabetes     Kidney stone     Pre-diabetes         Past Surgical History:   Procedure Laterality Date    COLONOSCOPY, SCREENING, HIGH RISK PATIENT N/A 12/23/2024    Procedure: COLONOSCOPY, SCREENING, HIGH RISK PATIENT;  Surgeon: Loco Orr DO;  Location: [facility] ENDO;  Service: Endoscopy;  Laterality: N/A;    TRANSRECTAL BIOPSY OF PROSTATE WITH ULTRASOUND GUIDANCE N/A 2/5/2025    Procedure: -uronav-BIOPSY, PROSTATE, RECTAL APPROACH, WITH US GUIDANCE (uploaded from [email or web address]);  Surgeon: Janeth Freire Jr., MD;  Location: [facility] ASU OR;  Service: Urology;  Laterality: N/A;  " Uronav prostate biopsy on 2/5/25.    XI ROBOTIC COLECTOMY, RIGHT N/A 2/21/2025    Procedure: XI ROBOTIC COLECTOMY, RIGHT;  Surgeon: Alexis Fuentes MD;  Location: [facility];  Service: General;  Laterality: N/A;        Family History   Problem Relation Name Age of Onset    No Known Problems Father      No Known Problems Mother         Social History[1]      Vitals:    04/03/25 1401   BP: 137/80   Pulse: 86   Resp: 18   Temp: 97.9 °F (36.6 °C)        Physical Exam:  /80 (BP Location: Right arm, Patient Position: Sitting)   Pulse 86   Temp 97.9 °F (36.6 °C) (Temporal)   Resp 18   Ht 5' 8" (1.727 m)   Wt 93.3 kg (205 lb 11 oz)   SpO2 98%   BMI 31.27 kg/m²     Physical Exam  Vitals and nursing note reviewed.   Constitutional:       Appearance: Normal appearance.   HENT:      Head: Normocephalic and atraumatic.      Nose: Nose normal.      Mouth/Throat:      Mouth: Mucous membranes are moist.      Pharynx: Oropharynx is clear.   Eyes:      Extraocular Movements: Extraocular movements intact.      Conjunctiva/sclera: Conjunctivae normal.   Cardiovascular:      Rate and Rhythm: Normal rate and regular rhythm.      Heart sounds: Normal heart sounds.   Pulmonary:      Effort: Pulmonary effort is normal.      Breath sounds: Normal breath sounds.   Abdominal:      General: Abdomen is flat. Bowel sounds are normal.      Palpations: Abdomen is soft.   Musculoskeletal:         General: Normal range of motion.      Cervical back: Normal range of motion and neck supple.   Skin:     General: Skin is warm and dry.   Neurological:      General: No focal deficit present.      Mental Status: He is alert and oriented to person, place, and time. Mental status is at baseline.   Psychiatric:         Mood and Affect: Mood normal.          Labs:  Lab Results   Component Value Date    WBC 7.18 04/01/2025    RBC 5.21 04/01/2025    HGB 12.0 (L) 04/01/2025    HCT 40.0 04/01/2025    MCV 77 (L) 04/01/2025    MCH 23.0 (L) 04/01/2025    " MCHC 30.0 (L) 04/01/2025    RDW 17.9 (H) 04/01/2025     04/01/2025    MPV 10.2 04/01/2025    GRAN 9.2 (H) 02/22/2025    GRAN 84.0 (H) 02/22/2025    LYMPH 0.7 (L) 02/22/2025    LYMPH 6.5 (L) 02/22/2025    MONO 1.0 02/22/2025    MONO 8.9 02/22/2025    EOS 0.0 02/22/2025    BASO 0.01 02/22/2025    EOSINOPHIL 0.0 02/22/2025    BASOPHIL 0.1 02/22/2025     Sodium   Date Value Ref Range Status   04/01/2025 140 136 - 145 mmol/L Final     Potassium   Date Value Ref Range Status   04/01/2025 4.4 3.5 - 5.1 mmol/L Final     Chloride   Date Value Ref Range Status   02/22/2025 106 95 - 110 mmol/L Final     CO2   Date Value Ref Range Status   04/01/2025 27 23 - 29 mmol/L Final     Glucose   Date Value Ref Range Status   02/22/2025 158 (H) 70 - 110 mg/dL Final     BUN   Date Value Ref Range Status   04/01/2025 18 8 - 23 mg/dL Final     Creatinine   Date Value Ref Range Status   04/01/2025 0.9 0.5 - 1.4 mg/dL Final     Calcium   Date Value Ref Range Status   04/01/2025 9.3 8.7 - 10.5 mg/dL Final     Total Protein   Date Value Ref Range Status   01/14/2025 7.1 6.0 - 8.4 g/dL Final     Albumin   Date Value Ref Range Status   04/01/2025 4.1 3.5 - 5.2 g/dL Final     Bilirubin Total   Date Value Ref Range Status   04/01/2025 0.3 0.1 - 1.0 mg/dL Final     Comment:     For infants and newborns, interpretation of results should be based   on gestational age, weight and in agreement with clinical   observations.    Premature Infant recommended reference ranges:   0-24 hours:  <8.0 mg/dL   24-48 hours: <12.0 mg/dL   3-5 days:    <15.0 mg/dL   6-29 days:   <15.0 mg/dL     ALP   Date Value Ref Range Status   04/01/2025 79 55 - 135 unit/L Final     AST   Date Value Ref Range Status   04/01/2025 12 10 - 40 unit/L Final     ALT   Date Value Ref Range Status   04/01/2025 15 10 - 44 unit/L Final     Anion Gap   Date Value Ref Range Status   02/22/2025 11 8 - 16 mmol/L Final       A/P:    Prostate adenocarcinoma   -Loren 4 + 4    -pretreatment    -on Lupron   -now on abiraterone and prednisone.  Tolerating both well.  Feels that the prednisone helps him with energy.  -plans to have radiation therapy after spacers placed with Dr. Talbert  -check PSA in 3 months.  Return to clinic in 3 months.      Iron deficiency   -due to blood loss from previous colon issues with a high-grade dysplasia   -will start on oral iron.  Can stop once his levels improve. Check in 3 months    Aurash Khoobehi, MD  Hematology and Oncology           [1]   Social History  Tobacco Use    Smoking status: Never     Passive exposure: Never    Smokeless tobacco: Never   Substance Use Topics    Alcohol use: Never    Drug use: Never

## 2025-04-24 ENCOUNTER — OFFICE VISIT (OUTPATIENT)
Dept: UROLOGY | Facility: CLINIC | Age: 70
End: 2025-04-24
Payer: MEDICARE

## 2025-04-24 DIAGNOSIS — C61 PROSTATE CANCER: Primary | ICD-10-CM

## 2025-04-24 RX ORDER — GENTAMICIN SULFATE 80 MG/100ML
80 INJECTION, SOLUTION INTRAVENOUS
OUTPATIENT
Start: 2025-04-24

## 2025-04-24 RX ORDER — CIPROFLOXACIN 500 MG/1
500 TABLET ORAL 2 TIMES DAILY
Qty: 6 TABLET | Refills: 0 | Status: SHIPPED | OUTPATIENT
Start: 2025-04-24 | End: 2025-04-27

## 2025-04-24 NOTE — H&P (VIEW-ONLY)
Audio Only Telehealth Visit     The patient location is: Home  The chief complaint leading to consultation is: Oligo-metastatic prostate cancer  Visit type: Virtual visit with audio only (telephone)  Total time spent in medical discussion with patient: 15 minutes  Total time spent on date of the encounter: 30 minutes       The reason for the audio only service rather than synchronous audio and video virtual visit was related to technical difficulties or patient preference/necessity.       Each patient to whom I provide medical services by telemedicine is:  (1) informed of the relationship between the physician and patient and the respective role of any other health care provider with respect to management of the patient; and (2) notified that they may decline to receive medical services by telemedicine and may withdraw from such care at any time. Patient verbally consented to receive this service via voice-only telephone call.      Ju Barraza is a 69 y.o. male who presents for follow up for oligo-metastatic prostate cancer.     Patient with a history of kidney stones who underwent routine colonoscopy for a positive Cologuard. Biopsy of a cecal mass showed tubular adenoma with high grade dysplasia on 12/23/24.      CT chest abdomen pelvis with contrast on 12/30/24 with a 4.6 cm cecal mass highly suspicious for primary colon neoplasm and with enlarged right retroperitoneal lymph nodes consistent with neoplastic nodes. Index nodes detailed above. There is also a enlarged prostate gland and with enlarged retroperitoneal nodes also recommend correlation clinically and with PSA.      Referred to Dr. Fuentes with general surgery for management of his cecal mass. However, his PSA was found to be significantly elevated at 251. Referred to urology for evaluation. Has never had his PSA tested prior.      MRI prostate on 1/19/25 with a moderately enlarged 52 cc prostate gland, demonstrating a subcentimeter PI-RADS 4 finding  within the left peripheral zone, and diffuse PI-RADS 3 findings throughout the transition zone. Right iliac chain adenopathy concerning for padmini spread of disease. Consider PSMA PET-CT for further evaluation.      He has no significant lower urinary tract symptoms. He has erectile dysfunction. Tried Viagra several years ago, but stopped due to a headache.      Works as a . Brother with a history of pancreatic cancer. Sister  from cancer as well. Never smoked.         Interval History     25: He is now s/p Uronav fusion prostate biopsy on 25. Pathology with [place] 4+4=8 prostate cancer in 1 core. Here today for follow up. PSMA PET CT pending. Of note, he is scheduled for robotic colectomy with Dr. Fuentes tomorrow.     25: Virtual audio visit today. Underwent colectomy with Dr. Fuentes which was negative for malignancy. PSMA PET CT with an enlarged prostate with uptake, abnormal uptake in the right franklin pelvic lymph nodes possibly concerning for metastasis.      Denies any fever, chills, gross hematuria, flank pain, bone pain, unintentional weight loss,  trauma or family history of  malignancy.         PSA  251                  25     IPSS QoL  3 1          25    ADT History  Lupron 45 3/14/25    Past Medical History:   Diagnosis Date    Borderline diabetes     Kidney stone     Pre-diabetes        Past Surgical History:   Procedure Laterality Date    COLONOSCOPY, SCREENING, HIGH RISK PATIENT N/A 2024    Procedure: COLONOSCOPY, SCREENING, HIGH RISK PATIENT;  Surgeon: Loco Orr DO;  Location: [facility] ENDO;  Service: Endoscopy;  Laterality: N/A;    TRANSRECTAL BIOPSY OF PROSTATE WITH ULTRASOUND GUIDANCE N/A 2025    Procedure: -uronav-BIOPSY, PROSTATE, RECTAL APPROACH, WITH US GUIDANCE (uploaded from [email or web address]);  Surgeon: Janeth Freire Jr., MD;  Location: [facility] ASU OR;  Service: Urology;  Laterality: N/A;  Uronav prostate biopsy on 25.    XI  ROBOTIC COLECTOMY, RIGHT N/A 2/21/2025    Procedure: XI ROBOTIC COLECTOMY, RIGHT;  Surgeon: Alexis Fuentes MD;  Location: [facility];  Service: General;  Laterality: N/A;       Review of patient's allergies indicates:  No Known Allergies    Medications Reviewed: see MAR    FOCUSED PHYSICAL EXAM:    There were no vitals filed for this visit.  There is no height or weight on file to calculate BMI.             LABS:    No results found for this or any previous visit (from the past 2 weeks).        Assessment/Diagnosis:    1. Prostate cancer  Procedure Order to Urology    ciprofloxacin HCl (CIPRO) 500 MG tablet            Plans:    - Visit today included increased complexity associated with the care of the episodic problem addressed and managing the longitudinal care of the patient due to the serious and/or complex managed problem(s) oligo-metastatic prostate cancer. Today's visit was spent almost entirely on counseling. We reviewed his diagnosis, stage, grade, risk group, and prognosis. We discussed the concept of low risk, moderate risk, and high risk disease. We discussed the different treatment options including active surveillance (as well as the surveillance protocol), prostate brachytherapy, IMRT, IGRT, cryotherapy, and both open and robotic prostatectomy.We also discussed the advantages, disadvantages, risks and benefits of the different options. Regarding radiation therapy we discussed treatment planning, the different techniques, short and long term complications. These included radiation cystitis, radiation proctitis, and impotence. We discussed success, failure, and salvage therapeutic options. We discussed surgical therapy in depth including preoperative preparation, surgical technique(including bladder neck and nerve-sparing techniques), postoperative recuperation and recovery, and short and long term complications including UTI, bleeding, blood clots,catheter dislodgement, etc. We discussed the risks of  reoperation, incontinence, impotence, and recurrence. We discussed preop and postop Kegels, post op penile rehab, and treatment options for incontinence and impotence. We discussed rates of cancer free survival and recurrence, as well as salvage therapeutic options. We discussed the possible  indications for adjuvant radiation therapy. I answered questions and addressed concerns.  - Scheduled to undergo radiation therapy with rad onc.   - Barrigel and marker placement on 4/29/25. Phone preop prior. Cipro sent. Needs instructions.          This service was not originating from a related E/M service provided within the previous 7 days nor will  to an E/M service or procedure within the next 24 hours or my soonest available appointment.  Prevailing standard of care was able to be met in this audio-only visit.

## 2025-04-24 NOTE — PROGRESS NOTES
Procedure Order to Urology [[id number]]    Electronically signed by: Janeth Freire Jr., MD on 04/24/25 1317 Status: Active   Ordering user: Janeth Freire Jr., MD 04/24/25 1317 Authorized by: Janeth Freire Jr., MD   Ordering mode: Standard   Frequency:  04/24/25 -   Released by: Janeth Freire Jr., MD 04/24/25 1317   Diagnoses  Prostate cancer [C61]   Questionnaire    Question Answer   Procedure Prostate SpaceOAR and Fiducial Markers   Pre-op Diagnosis Prostate cancer   Facility Name: [facility]   Order comments: Barrigel and marker placement on 4/29/25. Medically urgent.   OR , General sedation 80mg IV Gentamycin and 160mg IV Gentamycin if over 300 lbs , Rocephin 2 gram IV , start IV. NPO order BMP, PT INR UA and culture and EKG. 32239, [id number]

## 2025-04-24 NOTE — PROGRESS NOTES
Audio Only Telehealth Visit     The patient location is: Home  The chief complaint leading to consultation is: Oligo-metastatic prostate cancer  Visit type: Virtual visit with audio only (telephone)  Total time spent in medical discussion with patient: 15 minutes  Total time spent on date of the encounter: 30 minutes       The reason for the audio only service rather than synchronous audio and video virtual visit was related to technical difficulties or patient preference/necessity.       Each patient to whom I provide medical services by telemedicine is:  (1) informed of the relationship between the physician and patient and the respective role of any other health care provider with respect to management of the patient; and (2) notified that they may decline to receive medical services by telemedicine and may withdraw from such care at any time. Patient verbally consented to receive this service via voice-only telephone call.      Ju Barraza is a 69 y.o. male who presents for follow up for oligo-metastatic prostate cancer.     Patient with a history of kidney stones who underwent routine colonoscopy for a positive Cologuard. Biopsy of a cecal mass showed tubular adenoma with high grade dysplasia on 12/23/24.      CT chest abdomen pelvis with contrast on 12/30/24 with a 4.6 cm cecal mass highly suspicious for primary colon neoplasm and with enlarged right retroperitoneal lymph nodes consistent with neoplastic nodes. Index nodes detailed above. There is also a enlarged prostate gland and with enlarged retroperitoneal nodes also recommend correlation clinically and with PSA.      Referred to Dr. Fuentes with general surgery for management of his cecal mass. However, his PSA was found to be significantly elevated at 251. Referred to urology for evaluation. Has never had his PSA tested prior.      MRI prostate on 1/19/25 with a moderately enlarged 52 cc prostate gland, demonstrating a subcentimeter PI-RADS 4 finding  within the left peripheral zone, and diffuse PI-RADS 3 findings throughout the transition zone. Right iliac chain adenopathy concerning for padmini spread of disease. Consider PSMA PET-CT for further evaluation.      He has no significant lower urinary tract symptoms. He has erectile dysfunction. Tried Viagra several years ago, but stopped due to a headache.      Works as a . Brother with a history of pancreatic cancer. Sister  from cancer as well. Never smoked.         Interval History     25: He is now s/p Uronav fusion prostate biopsy on 25. Pathology with [place] 4+4=8 prostate cancer in 1 core. Here today for follow up. PSMA PET CT pending. Of note, he is scheduled for robotic colectomy with Dr. Fuentes tomorrow.     25: Virtual audio visit today. Underwent colectomy with Dr. Fuentes which was negative for malignancy. PSMA PET CT with an enlarged prostate with uptake, abnormal uptake in the right franklin pelvic lymph nodes possibly concerning for metastasis.      Denies any fever, chills, gross hematuria, flank pain, bone pain, unintentional weight loss,  trauma or family history of  malignancy.         PSA  251                  25     IPSS QoL  3 1          25    Past Medical History:   Diagnosis Date    Borderline diabetes     Kidney stone     Pre-diabetes        Past Surgical History:   Procedure Laterality Date    COLONOSCOPY, SCREENING, HIGH RISK PATIENT N/A 2024    Procedure: COLONOSCOPY, SCREENING, HIGH RISK PATIENT;  Surgeon: Loco Orr DO;  Location: [facility] ENDO;  Service: Endoscopy;  Laterality: N/A;    TRANSRECTAL BIOPSY OF PROSTATE WITH ULTRASOUND GUIDANCE N/A 2025    Procedure: -uronav-BIOPSY, PROSTATE, RECTAL APPROACH, WITH US GUIDANCE (uploaded from [email or web address]);  Surgeon: Janeth Freire Jr., MD;  Location: [facility] ASU OR;  Service: Urology;  Laterality: N/A;  Uronav prostate biopsy on 25.    XI ROBOTIC COLECTOMY, RIGHT N/A 2025     Procedure: XI ROBOTIC COLECTOMY, RIGHT;  Surgeon: Alexis Fuentes MD;  Location: [facility];  Service: General;  Laterality: N/A;       Review of patient's allergies indicates:  No Known Allergies    Medications Reviewed: see MAR    FOCUSED PHYSICAL EXAM:    There were no vitals filed for this visit.  There is no height or weight on file to calculate BMI.             LABS:    No results found for this or any previous visit (from the past 2 weeks).        Assessment/Diagnosis:    1. Prostate cancer  Procedure Order to Urology    ciprofloxacin HCl (CIPRO) 500 MG tablet            Plans:    - Visit today included increased complexity associated with the care of the episodic problem addressed and managing the longitudinal care of the patient due to the serious and/or complex managed problem(s) oligo-metastatic prostate cancer. Today's visit was spent almost entirely on counseling. We reviewed his diagnosis, stage, grade, risk group, and prognosis. We discussed the concept of low risk, moderate risk, and high risk disease. We discussed the different treatment options including active surveillance (as well as the surveillance protocol), prostate brachytherapy, IMRT, IGRT, cryotherapy, and both open and robotic prostatectomy.We also discussed the advantages, disadvantages, risks and benefits of the different options. Regarding radiation therapy we discussed treatment planning, the different techniques, short and long term complications. These included radiation cystitis, radiation proctitis, and impotence. We discussed success, failure, and salvage therapeutic options. We discussed surgical therapy in depth including preoperative preparation, surgical technique(including bladder neck and nerve-sparing techniques), postoperative recuperation and recovery, and short and long term complications including UTI, bleeding, blood clots,catheter dislodgement, etc. We discussed the risks of reoperation, incontinence, impotence,  and recurrence. We discussed preop and postop Kegels, post op penile rehab, and treatment options for incontinence and impotence. We discussed rates of cancer free survival and recurrence, as well as salvage therapeutic options. We discussed the possible  indications for adjuvant radiation therapy. I answered questions and addressed concerns.  - Scheduled to undergo radiation therapy with rad onc.   - Barrigel and marker placement on 4/29/25. Phone preop prior. Cipro sent. Needs instructions.          This service was not originating from a related E/M service provided within the previous 7 days nor will  to an E/M service or procedure within the next 24 hours or my soonest available appointment.  Prevailing standard of care was able to be met in this audio-only visit.

## 2025-04-28 ENCOUNTER — HOSPITAL ENCOUNTER (OUTPATIENT)
Dept: PREADMISSION TESTING | Facility: HOSPITAL | Age: 70
Discharge: HOME | End: 2025-04-28
Attending: UROLOGY
Payer: MEDICARE

## 2025-04-28 ENCOUNTER — TELEPHONE (OUTPATIENT)
Dept: UROLOGY | Facility: CLINIC | Age: 70
End: 2025-04-28
Payer: MEDICARE

## 2025-04-28 ENCOUNTER — ANESTHESIA EVENT (OUTPATIENT)
Dept: SURGERY | Facility: HOSPITAL | Age: 70
End: 2025-04-28
Payer: MEDICARE

## 2025-04-28 ENCOUNTER — TELEPHONE (OUTPATIENT)
Dept: RADIATION ONCOLOGY | Facility: CLINIC | Age: 70
End: 2025-04-28

## 2025-04-28 VITALS — HEIGHT: 68 IN | WEIGHT: 205 LBS | BODY MASS INDEX: 31.07 KG/M2

## 2025-04-28 DIAGNOSIS — Z01.810 PREOP CARDIOVASCULAR EXAM: ICD-10-CM

## 2025-04-28 DIAGNOSIS — C61 PROSTATE CANCER: ICD-10-CM

## 2025-04-28 NOTE — TELEPHONE ENCOUNTER
Attempted to reach patient to discuss appointment for radiation treatment planning.  No answer and not able to leave voice mail as box is full.

## 2025-04-28 NOTE — TELEPHONE ENCOUNTER
----- Message from Vida sent at 4/28/2025  8:43 AM CDT -----  Type: Needs Medical AdviceWho Called:  pt Best Call Back Number: [phone] Information: pt requesting call back in regards to needing to know if he needs to start medication today and also how to take medication please advise

## 2025-04-28 NOTE — DISCHARGE INSTRUCTIONS
To confirm, Your doctor has instructed you that surgery is scheduled for: 4/29/25 WITH DR. SALAS    Please report to [facility], Registration the morning of surgery. You must check-in and receive a wristband before going to your procedure.  [address] [place] [address] [place] [address]    Pre-Op will call the afternoon prior to surgery between 1:00 and 6:00 PM with the final arrival time.  Phone number: [phone]    PLEASE NOTE:  The surgery schedule has many variables which may affect the time of your surgery case.  Family members should be available if your surgery time changes.  Plan to be here the day of your procedure between 4-6 hours.    MEDICATIONS:  TAKE ONLY THESE MEDICATIONS WITH A SMALL SIP OF WATER THE MORNING OF YOUR PROCEDURE:  SEE MED LIST      DO NOT TAKE THESE MEDICATIONS 5-7 DAYS PRIOR to your procedure or per your surgeon's request:   ASPIRIN, ALEVE, ADVIL, IBUPROFEN, FISH OIL VITAMIN E, HERBALS  (May take Tylenol)    ONLY if you are prescribed any types of blood thinners such as:  Aspirin, Coumadin, Plavix, Pradaxa, Xarelto, Aggrenox, Effient, Eliquis, Savasya, Brilinta, or any other, ask your surgeon whether you should stop taking them and how long before surgery you should stop.  You may also need to verify with the prescribing physician if it is ok to stop your medication.      INSTRUCTIONS IMPORTANT!!  Do not eat or drink anything between midnight and the time of your procedure- this includes gum, mints, and candy.  EXCEPT: you may have clear liquids such as:  WATER, BLACK COFFEE, UNSWEET TEA, OR GATORADE (NO RED OR PURPLE) UP TO 2 HOURS PRIOR TO YOUR ARRIVAL TIME.  Do not smoke or drink alcoholic beverages 24 hours prior to your procedure.  Shower the night before AND the morning of your procedure with a Chlorhexidine wash such as Hibiclens or Dial antibacterial soap from the neck down.  Do not get it on your face or in your eyes.  You may use your own shampoo and face  wash. This helps your skin to be as bacteria free as possible.    If you wear contact lenses, dentures, hearing aids or glasses, bring a container to put them in during surgery and give to a family member for safe keeping.  Please leave all jewelry, piercing's and valuables at home. You must remove your false eyelashes prior to surgery.    DO NOT remove hair from the surgery site.  Do not shave the incision site unless you are given specific instructions to do so.    ONLY if you have been diagnosed with sleep apnea please bring your C-PAP machine.  ONLY if you wear home oxygen please bring your portable oxygen tank the day of your procedure.  ONLY if you have a history of OPEN HEART SURGERY you will need a clearance from your Cardiologist per Anesthesia.      ONLY for patients requiring bowel prep, written instructions will be given by your doctor's office.  ONLY if you have any type of stimulator implant or any type of implanted device with a remote control.  Please bring the controller with you the morning of surgery  If your doctor has scheduled you for an overnight stay, bring a small overnight bag with any personal items you need.  Make arrangements in advance for transportation home by a responsible adult. You can not go home in an uber or a cab per hospital policy.  It is not safe to drive a vehicle during the 24 hours after anesthesia.          All  facilities and properties are tobacco free.  Smoking is NOT allowed.   If you have any questions about these instructions, call Pre-Op Admit  Nursing at [phone] or the Pre-Op Day Surgery Unit at [phone].

## 2025-04-29 ENCOUNTER — HOSPITAL ENCOUNTER (OUTPATIENT)
Facility: HOSPITAL | Age: 70
Discharge: HOME | End: 2025-04-29
Attending: UROLOGY | Admitting: UROLOGY
Payer: MEDICARE

## 2025-04-29 ENCOUNTER — ANESTHESIA (OUTPATIENT)
Dept: SURGERY | Facility: HOSPITAL | Age: 70
End: 2025-04-29
Payer: MEDICARE

## 2025-04-29 DIAGNOSIS — C61 PROSTATE CANCER: Primary | ICD-10-CM

## 2025-04-29 PROCEDURE — 94799 UNLISTED PULMONARY SVC/PX: CPT

## 2025-04-29 PROCEDURE — 25000003 PHARM REV CODE 250: Performed by: NURSE ANESTHETIST, CERTIFIED REGISTERED

## 2025-04-29 PROCEDURE — 37000008 HC ANESTHESIA 1ST 15 MINUTES: Performed by: UROLOGY

## 2025-04-29 PROCEDURE — 99900035 HC TECH TIME PER 15 MIN (STAT)

## 2025-04-29 PROCEDURE — 71000033 HC RECOVERY, INTIAL HOUR: Performed by: UROLOGY

## 2025-04-29 PROCEDURE — 71000039 HC RECOVERY, EACH ADD'L HOUR: Performed by: UROLOGY

## 2025-04-29 PROCEDURE — A4648 IMPLANTABLE TISSUE MARKER: HCPCS | Performed by: UROLOGY

## 2025-04-29 PROCEDURE — 55874 TPRNL PLMT BIODEGRDABL MATRL: CPT | Mod: ,,, | Performed by: UROLOGY

## 2025-04-29 PROCEDURE — 55876 PLACE RT DEVICE/MARKER PROS: CPT | Mod: 51,,, | Performed by: UROLOGY

## 2025-04-29 PROCEDURE — 63600175 PHARM REV CODE 636 W HCPCS: Performed by: NURSE ANESTHETIST, CERTIFIED REGISTERED

## 2025-04-29 PROCEDURE — C1889 IMPLANT/INSERT DEVICE, NOC: HCPCS | Performed by: UROLOGY

## 2025-04-29 PROCEDURE — 71000016 HC POSTOP RECOV ADDL HR: Performed by: UROLOGY

## 2025-04-29 PROCEDURE — 63600175 PHARM REV CODE 636 W HCPCS: Performed by: UROLOGY

## 2025-04-29 PROCEDURE — 76872 US TRANSRECTAL: CPT | Mod: 26,XU,, | Performed by: UROLOGY

## 2025-04-29 PROCEDURE — 36000707: Performed by: UROLOGY

## 2025-04-29 PROCEDURE — 36000706: Performed by: UROLOGY

## 2025-04-29 PROCEDURE — 37000009 HC ANESTHESIA EA ADD 15 MINS: Performed by: UROLOGY

## 2025-04-29 PROCEDURE — 71000015 HC POSTOP RECOV 1ST HR: Performed by: UROLOGY

## 2025-04-29 PROCEDURE — 25000003 PHARM REV CODE 250: Performed by: ANESTHESIOLOGY

## 2025-04-29 DEVICE — MARKERS GOLD SOFT TISSUE: Type: IMPLANTABLE DEVICE | Site: PROSTATE | Status: FUNCTIONAL

## 2025-04-29 RX ORDER — GLUCAGON 1 MG
1 KIT INJECTION
Status: DISCONTINUED | OUTPATIENT
Start: 2025-04-29 | End: 2025-04-29 | Stop reason: HOSPADM

## 2025-04-29 RX ORDER — CEFTRIAXONE 2 G/1
2 INJECTION, POWDER, FOR SOLUTION INTRAMUSCULAR; INTRAVENOUS
Status: DISCONTINUED | OUTPATIENT
Start: 2025-04-29 | End: 2025-04-29 | Stop reason: HOSPADM

## 2025-04-29 RX ORDER — FENTANYL CITRATE 50 UG/ML
25 INJECTION, SOLUTION INTRAMUSCULAR; INTRAVENOUS EVERY 5 MIN PRN
Status: DISCONTINUED | OUTPATIENT
Start: 2025-04-29 | End: 2025-04-29 | Stop reason: HOSPADM

## 2025-04-29 RX ORDER — ACETAMINOPHEN 10 MG/ML
INJECTION, SOLUTION INTRAVENOUS
Status: DISCONTINUED | OUTPATIENT
Start: 2025-04-29 | End: 2025-04-29

## 2025-04-29 RX ORDER — CEFAZOLIN SODIUM 1 G/3ML
INJECTION, POWDER, FOR SOLUTION INTRAMUSCULAR; INTRAVENOUS
Status: DISCONTINUED | OUTPATIENT
Start: 2025-04-29 | End: 2025-04-29

## 2025-04-29 RX ORDER — CIPROFLOXACIN 500 MG/5ML
500 KIT ORAL 2 TIMES DAILY
COMMUNITY

## 2025-04-29 RX ORDER — OXYCODONE HYDROCHLORIDE 5 MG/1
5 TABLET ORAL
Status: DISCONTINUED | OUTPATIENT
Start: 2025-04-29 | End: 2025-04-29 | Stop reason: HOSPADM

## 2025-04-29 RX ORDER — PROPOFOL 10 MG/ML
VIAL (ML) INTRAVENOUS
Status: DISCONTINUED | OUTPATIENT
Start: 2025-04-29 | End: 2025-04-29

## 2025-04-29 RX ORDER — EPHEDRINE SULFATE 50 MG/ML
INJECTION, SOLUTION INTRAVENOUS
Status: DISCONTINUED | OUTPATIENT
Start: 2025-04-29 | End: 2025-04-29

## 2025-04-29 RX ORDER — GENTAMICIN SULFATE 80 MG/100ML
80 INJECTION, SOLUTION INTRAVENOUS
Status: COMPLETED | OUTPATIENT
Start: 2025-04-29 | End: 2025-04-29

## 2025-04-29 RX ORDER — LIDOCAINE HYDROCHLORIDE 10 MG/ML
1 INJECTION, SOLUTION EPIDURAL; INFILTRATION; INTRACAUDAL; PERINEURAL ONCE
Status: DISCONTINUED | OUTPATIENT
Start: 2025-04-29 | End: 2025-04-29 | Stop reason: HOSPADM

## 2025-04-29 RX ORDER — LIDOCAINE HYDROCHLORIDE 20 MG/ML
INJECTION INTRAVENOUS
Status: DISCONTINUED | OUTPATIENT
Start: 2025-04-29 | End: 2025-04-29

## 2025-04-29 RX ORDER — DEXAMETHASONE SODIUM PHOSPHATE 4 MG/ML
INJECTION, SOLUTION INTRA-ARTICULAR; INTRALESIONAL; INTRAMUSCULAR; INTRAVENOUS; SOFT TISSUE
Status: DISCONTINUED | OUTPATIENT
Start: 2025-04-29 | End: 2025-04-29

## 2025-04-29 RX ORDER — ONDANSETRON HYDROCHLORIDE 2 MG/ML
INJECTION, SOLUTION INTRAMUSCULAR; INTRAVENOUS
Status: DISCONTINUED | OUTPATIENT
Start: 2025-04-29 | End: 2025-04-29

## 2025-04-29 RX ORDER — MIDAZOLAM HYDROCHLORIDE 1 MG/ML
INJECTION INTRAMUSCULAR; INTRAVENOUS
Status: DISCONTINUED | OUTPATIENT
Start: 2025-04-29 | End: 2025-04-29

## 2025-04-29 RX ORDER — FENTANYL CITRATE 50 UG/ML
INJECTION, SOLUTION INTRAMUSCULAR; INTRAVENOUS
Status: DISCONTINUED | OUTPATIENT
Start: 2025-04-29 | End: 2025-04-29

## 2025-04-29 RX ADMIN — CEFAZOLIN 2 G: 1 INJECTION, POWDER, FOR SOLUTION INTRAVENOUS at 10:04

## 2025-04-29 RX ADMIN — ONDANSETRON 4 MG: 2 INJECTION INTRAMUSCULAR; INTRAVENOUS at 10:04

## 2025-04-29 RX ADMIN — FENTANYL CITRATE 50 MCG: 0.05 INJECTION, SOLUTION INTRAMUSCULAR; INTRAVENOUS at 10:04

## 2025-04-29 RX ADMIN — EPHEDRINE SULFATE 20 MG: 50 INJECTION, SOLUTION INTRAMUSCULAR; INTRAVENOUS; SUBCUTANEOUS at 10:04

## 2025-04-29 RX ADMIN — SODIUM CHLORIDE, SODIUM GLUCONATE, SODIUM ACETATE, POTASSIUM CHLORIDE AND MAGNESIUM CHLORIDE: 526; 502; 368; 37; 30 INJECTION, SOLUTION INTRAVENOUS at 08:04

## 2025-04-29 RX ADMIN — ACETAMINOPHEN 1000 MG: 10 INJECTION INTRAVENOUS at 10:04

## 2025-04-29 RX ADMIN — EPHEDRINE SULFATE 10 MG: 50 INJECTION, SOLUTION INTRAMUSCULAR; INTRAVENOUS; SUBCUTANEOUS at 10:04

## 2025-04-29 RX ADMIN — DEXAMETHASONE SODIUM PHOSPHATE 8 MG: 4 INJECTION, SOLUTION INTRA-ARTICULAR; INTRALESIONAL; INTRAMUSCULAR; INTRAVENOUS; SOFT TISSUE at 10:04

## 2025-04-29 RX ADMIN — MIDAZOLAM HYDROCHLORIDE 1 MG: 1 INJECTION, SOLUTION INTRAMUSCULAR; INTRAVENOUS at 10:04

## 2025-04-29 RX ADMIN — PROPOFOL 150 MG: 10 INJECTION, EMULSION INTRAVENOUS at 10:04

## 2025-04-29 RX ADMIN — LIDOCAINE HYDROCHLORIDE 75 MG: 20 INJECTION, SOLUTION INTRAVENOUS at 10:04

## 2025-04-29 RX ADMIN — GENTAMICIN SULFATE 80 MG: 80 INJECTION, SOLUTION INTRAVENOUS at 08:04

## 2025-04-29 NOTE — TRANSFER OF CARE
Anesthesia Transfer of Care Note    Patient: Ju Barraza    Procedure(s) Performed: Procedure(s) (LRB):  ULTRASOUND, PROSTATE, RECTAL APPROACH, WITH GOLD FIDUCIAL MARKER INSERTION (N/A)  -barrigel-TRANSPERINEAL INSERTION OF PERIPROSTATIC GEL (N/A)    Patient location: PACU    Anesthesia Type: general    Transport from OR: Transported from OR on 2-3 L/min O2 by NC with adequate spontaneous ventilation    Post pain: adequate analgesia    Post assessment: no apparent anesthetic complications and tolerated procedure well    Post vital signs: stable    Level of consciousness: sedated    Nausea/Vomiting: no nausea/vomiting    Complications: none    Transfer of care protocol was followed    Last vitals: Visit Vitals  BP (!) 148/80 (BP Location: Left arm, Patient Position: Lying)   Pulse 83   Temp 36.4 °C (97.5 °F) (Skin)   Resp 16   Wt 93 kg (205 lb)   SpO2 100%   BMI 31.17 kg/m²

## 2025-04-29 NOTE — PLAN OF CARE
Patient awake, alert, oriented. Vital signs stable. Patient verbalizes readiness for discharge. Discharge instructions reviewed with patient and patient's spouse. Patient and patient's spouse verbalized understanding. IV removed, catheter intact. All belongings returned to patient. Patient transferred to car via wheelchair. Safety maintained.

## 2025-04-29 NOTE — ANESTHESIA PROCEDURE NOTES
Intubation    Date/Time: 4/29/2025 10:35 AM    Performed by: Qasim Alvarez CRNA  Authorized by: Jimbo Plasencia MD    Intubation:     Induction:  Intravenous    Intubated:  Postinduction    Mask Ventilation:  Easy mask    Attempts:  1    Attempted By:  CRNA    Difficult Airway Encountered?: No      Complications:  None    Airway Device:  Supraglottic airway/LMA    Airway Device Size:  4.0    Style/Cuff Inflation:  Cuffed (inflated to minimal occlusive pressure)    Placement Verified By:  Capnometry    Complicating Factors:  None    Findings Post-Intubation:  BS equal bilateral

## 2025-04-29 NOTE — PLAN OF CARE
Patient ready for surgery. Surgery and anesthesia consents signed. Educated on incentive spirometer use. Belongings in pre-op locker. Taylor set up with text message notifications.

## 2025-04-29 NOTE — ANESTHESIA PREPROCEDURE EVALUATION
04/29/2025  Ju Barraza is a 69 y.o., male.      Pre-op Assessment    I have reviewed the Patient Summary Reports.     I have reviewed the Nursing Notes. I have reviewed the NPO Status.   I have reviewed the Medications.     Review of Systems  Anesthesia Hx:  No problems with previous Anesthesia             Denies Family Hx of Anesthesia complications.    Denies Personal Hx of Anesthesia complications.                    Cardiovascular:  Exercise tolerance: good                 ECG has been reviewed.                            Pulmonary:  Pulmonary Normal                       Renal/:  Chronic Renal Disease   Prostate CA             Hepatic/GI:  Bowel Prep.      Colon Ca - tubular adenoma                 Physical Exam  General: Well nourished    Airway:  Mallampati: II   Mouth Opening: Normal  TM Distance: Normal  Tongue: Normal  Neck ROM: Normal ROM    Dental:  Intact, Edentulous, Dentures    Chest/Lungs:  Clear to auscultation, Normal Respiratory Rate        Anesthesia Plan  Type of Anesthesia, risks & benefits discussed:    Anesthesia Type: Gen Supraglottic Airway  Intra-op Monitoring Plan: Standard ASA Monitors  Post Op Pain Control Plan: multimodal analgesia and IV/PO Opioids PRN  Induction:  IV  Airway Plan: , Post-Induction  Informed Consent: Informed consent signed with the Patient and all parties understand the risks and agree with anesthesia plan.  All questions answered.   ASA Score: 3    Ready For Surgery From Anesthesia Perspective.     .

## 2025-04-29 NOTE — OP NOTE
[facility] Urology Operative Report    Date: 04/29/2025    Staff Surgeon: Janeth Freire Jr     Pre-Op Diagnosis: Prostate cancer     Post-Op Diagnosis: Same     Procedure(s) Performed:   Transperineal placement of periprostatic biodegradable spacing gel (Barrigel) with ultrasound needle guidance (45231)  Transperineal placement of gold seed fiducial markers into prostate (43744)  Transrectal ultrasound of prostate including volumetric measurement (75359)     Specimen(s): None     Anesthesia: General anesthesia     Findings: Normal prostate anatomy, well defined planes  Prostate volume 50 cm3   Appropriate displacement of anterior rectum from prostate  Markers placed: right mid lateral, left base medial and left apex medial.       Estimated Blood Loss: Minimal     Drains: None     Complications: None     Indications for procedure:  Ju Barraza is a 69 y.o. male with prostate cancer who is pending external beam radiation.  He presents today for fiducial marker placement as well as placement of absorbable gel to displace rectum away from prostate to decrease complications of and increased quality of life during and after radiotherapy, after extensive discussion of risks and benefits.  After extensive discussion of risks and benefits, and answering all questions, appropriate informed consent was obtained for the aforementioned procedures.     Procedure in detail:  After appropriate informed consent was obtained the patient was taken to the operating room and placed in dorsal lithotomy position.  Preoperative antibiotics were administered, and a WHO approved time-out was performed.     His scrotum was taped out of the way, and perineum prepped with ChloraPrep.  The side-fire transrectal ultrasound probe was secured to mounting device and passed into the rectum.  Three-dimensional measurements of the prostate were taken with combination of axial and sagittal plane views as noted above, measurements and volumes as noted  above  No gross abnormalities of the prostate were noted, and seminal vesicles appeared normal bilaterally.  He did have clear well defined anatomy with a low rectal hump and distinctly visible hyperechoic denonvillers fascia. .     Under transrectal ultrasound guidance, 3 prostate gold seed fiducial markers were placed transperineally, continuously visualizing needle tip and inserting these markers at the right mid lateral, left base medial and left apex medial.    Axial view was then obtained to see the shadowing of these markers and confirm their placement.     At this time, the gel was prepared. In the sagittal plane, under transrectal ultrasound guidance, the 15 cm 18 gauge needle was passed transperineally and seen to pass through the rectal urethralis muscle slowly advancing the needle tip into the perirectal fat inferior to the prostate, following Denonviller's fascia inferior to the prostate, while passing over the rectal hump and staying anterior to the rectal wall.  The needle tip position was confirmed mid gland in the appropriate space in both the sagittal and axial field.      Again, with the needle tip at mid gland, the axial field was used to confirm the needle was not in the rectal wall or tenting the rectal wall with movement of the needle tip without corresponding movement of the rectal wall to confirm perirectal placement. The needle was then held in this stable position, confirmed by ultrasound, and the gel was administered in 3 cc syringes x 3.       All 9 cc were injected. Desired result was noted, with good spread of the gel into this génesis prostatic space.       The patient tolerated the procedure well.  There were no complications.  There was no suspected penetration or compromise of the rectal wall.  Perineal pressure was held for 5 minutes postprocedure without any bleeding noted afterwards.     Disposition:  He will be discharged home today after uncomplicated procedure as above.       Finish antibiotics.  Void prior to discharge.  Ibuprofen 800mg prn pain.  Follow up with Radiation Oncology for CT simulation for his radiotherapy as planned.   Follow-up with me in 4 months with PSA prior.

## 2025-04-29 NOTE — DISCHARGE INSTRUCTIONS
"Finish antibiotics.  Void prior to discharge.  Ibuprofen 800mg prn pain.  Follow up with Radiation Oncology for CT simulation for his radiotherapy as planned.   Follow-up with me in 4 months with PSA prior.           Discharge Instructions: After Your Surgery/Procedure  Youve just had surgery. During surgery you were given medicine called anesthesia to keep you relaxed and free of pain. After surgery you may have some pain or nausea. This is common. Here are some tips for feeling better and getting well after surgery.     Stay on schedule with your medication.   Going home  Your doctor or nurse will show you how to take care of yourself when you go home. He or she will also answer your questions. Have an adult family member or friend drive you home.      For your safety we recommend these precaution for the first 24 hours after your procedure:  Do not drive or use heavy equipment.  Do not make important decisions or sign legal papers.  Do not drink alcohol.  Have someone stay with you, if needed. He or she can watch for problems and help keep you safe.  Your concentration, balance, coordination, and judgement may be impaired for many hours after anesthesia.  Use caution when ambulating or standing up.     You may feel weak and "washed out" after anesthesia and surgery.      Subtle residual effects of general anesthesia or sedation with regional / local anesthesia can last more than 24 hours.  Rest for the remainder of the day or longer if your Doctor/Surgeon has advised you to do so.  Although you may feel normal within the first 24 hours, your reflexes and mental ability may be impaired without you realizing it.  You may feel dizzy, lightheaded or sleepy for 24 hours or longer.      Be sure to go to all follow-up visits with your doctor. And rest after your surgery for as long as your doctor tells you to.  Coping with pain  If you have pain after surgery, pain medicine will help you feel better. Take it as told, " before pain becomes severe. Also, ask your doctor or pharmacist about other ways to control pain. This might be with heat, ice, or relaxation. And follow any other instructions your surgeon or nurse gives you.  Tips for taking pain medicine  To get the best relief possible, remember these points:  Pain medicines can upset your stomach. Taking them with a little food may help.  Most pain relievers taken by mouth need at least 20 to 30 minutes to start to work.  Taking medicine on a schedule can help you remember to take it. Try to time your medicine so that you can take it before starting an activity. This might be before you get dressed, go for a walk, or sit down for dinner.  Constipation is a common side effect of pain medicines. Call your doctor before taking any medicines such as laxatives or stool softeners to help ease constipation. Also ask if you should skip any foods. Drinking lots of fluids and eating foods such as fruits and vegetables that are high in fiber can also help. Remember, do not take laxatives unless your surgeon has prescribed them.  Drinking alcohol and taking pain medicine can cause dizziness and slow your breathing. It can even be deadly. Do not drink alcohol while taking pain medicine.  Pain medicine can make you react more slowly to things. Do not drive or run machinery while taking pain medicine.  Your health care provider may tell you to take acetaminophen to help ease your pain. Ask him or her how much you are supposed to take each day. Acetaminophen or other pain relievers may interact with your prescription medicines or other over-the-counter (OTC) drugs. Some prescription medicines have acetaminophen and other ingredients. Using both prescription and OTC acetaminophen for pain can cause you to overdose. Read the labels on your OTC medicines with care. This will help you to clearly know the list of ingredients, how much to take, and any warnings. It may also help you not take too  much acetaminophen. If you have questions or do not understand the information, ask your pharmacist or health care provider to explain it to you before you take the OTC medicine.  Managing nausea  Some people have an upset stomach after surgery. This is often because of anesthesia, pain, or pain medicine, or the stress of surgery. These tips will help you handle nausea and eat healthy foods as you get better. If you were on a special food plan before surgery, ask your doctor if you should follow it while you get better. These tips may help:  Do not push yourself to eat. Your body will tell you when to eat and how much.  Start off with clear liquids and soup. They are easier to digest.  Next try semi-solid foods, such as mashed potatoes, applesauce, and gelatin, as you feel ready.  Slowly move to solid foods. Dont eat fatty, rich, or spicy foods at first.  Do not force yourself to have 3 large meals a day. Instead eat smaller amounts more often.  Take pain medicines with a small amount of solid food, such as crackers or toast, to avoid nausea.     Call your surgeon if  You still have pain an hour after taking medicine. The medicine may not be strong enough.  You feel too sleepy, dizzy, or groggy. The medicine may be too strong.  You have side effects like nausea, vomiting, or skin changes, such as rash, itching, or hives.       If you have obstructive sleep apnea  You were given anesthesia medicine during surgery to keep you comfortable and free of pain. After surgery, you may have more apnea spells because of this medicine and other medicines you were given. The spells may last longer than usual.   At home:  Keep using the continuous positive airway pressure (CPAP) device when you sleep. Unless your health care provider tells you not to, use it when you sleep, day or night. CPAP is a common device used to treat obstructive sleep apnea.  Talk with your provider before taking any pain medicine, muscle relaxants, or  sedatives. Your provider will tell you about the possible dangers of taking these medicines.  © 1835-0921 [employer]. [address] [place] [address], [place] [address]. All rights reserved. This information is not intended as a substitute for professional medical care. Always follow your healthcare professional's instructions.          Using an Incentive Spirometer    An incentive spirometer is a device that helps you do deep breathing exercises. These exercises expand your lungs, aid in circulation, and help prevent pneumonia. Deep breathing exercises also help you breathe better and improve the function of your lungs by:  Keeping your lungs clear  Strengthening your breathing muscles  Helping prevent respiratory complications or problems  The incentive spirometer gives you a way to take an active part in recover. A nurse or therapist will teach you breathing exercises. To do these exercises, you will breathe in through your mouth and not your nose. The incentive spirometer only works correctly if you breathe in through your mouth.  Steps to clear lungs  Step 1. Exhale normally. Then, inhale normally.  Relax and breathe out.  Step 2. Place your lips tightly around the mouthpiece.  Make sure the device is upright and not tilted.  Step 3. Inhale as much air as you can through the mouthpiece (don't breath through your nose).  Inhale slowly and deeply.  Hold your breath long enough to keep the balls or disk raised for at least 3 to 5 seconds, or as instructed by your healthcare provider.  Some spirometers have an indicator to let you know that you are breathing in too fast. If the indicator goes off, breathe in more slowly.  Step 4. Repeat the exercise regularly.  Do this exercise every hour while you're awake, or as instructed by your healthcare provider.  If you were taught deep breathing and coughing exercises, do them regularly as instructed by your healthcare provider.           Post op instructions for  prevention of DVT  What is deep vein thrombosis?  Deep vein thrombosis (DVT) is the medical term for blood clots in the deep veins of the leg.  These blood clots can be dangerous.  A DVT can block a blood vessel and keep blood from getting where it needs to go.  Another problem is that the clot can travel to other parts of the body such as the lungs.  A clot that travels to the lungs is called a pulmonary embolus (PE) and can cause serious problems with breathing which can lead to death.  Am I at risk for DVT/PE?  If you are not very active, you are at risk of DVT.  Anyone confined to bed, sitting for long periods of time, recovering from surgery, etc. increases the risk of DVT.  Other risk factors are cancer diagnosis, certain medications, estrogen replacement in any form,older age, obesity, pregnancy, smoking, history of clotting disorders, and dehydration.  How will I know if I have a DVT?  Swelling in the lower leg  Pain  Warmth, redness, hardness or bulging of the vein  If you have any of these symptoms, call your doctors office right away.  Some people will not have any symptoms until the clot moves to the lungs.  What are the symptoms of a PE?  Panting, shortness of breath, or trouble breathing  Sharp, knife-like chest pain when you breathe  Coughing or coughing up blood  Rapid heartbeat  If you have any of these symptoms or get worse quickly, call 911 for emergency treatment.  How can I prevent a DVT?  Avoid long periods of inactivity and dont cross your legs--get up and walk around every hour or so.  Stay active--walking after surgery is highly encouraged.  This means you should get out of the house and walk in the neighborhood.  Going up and down stairs will not impair healing (unless advised against such activity by your doctor).    Drink plenty of noncaffeinated, nonalcoholic fluids each day to prevent dehydration.  Wear special support stockings, if they have been advised by your doctor.  If you  travel, stop at least once an hour and walk around.  Avoid smoking (assistance with stopping is available through your healthcare provider)  Always notify your doctor if you are not able to follow the post operative instructions that are given to you at the time of discharge.  It may be necessary to prescribe one of the medications available to prevent DVT.          We hope your stay was comfortable as you heal now, mend and rest.    For we have enjoyed taking care of you by giving your our best.    And as you get better, by regaining your health and strength;   We count it as a privilege to have served you and hope your time at [facility] was well spent.      Thank  You!!!

## 2025-04-30 VITALS
RESPIRATION RATE: 16 BRPM | TEMPERATURE: 98 F | HEART RATE: 70 BPM | WEIGHT: 205 LBS | OXYGEN SATURATION: 100 % | SYSTOLIC BLOOD PRESSURE: 145 MMHG | BODY MASS INDEX: 31.17 KG/M2 | DIASTOLIC BLOOD PRESSURE: 79 MMHG

## 2025-04-30 NOTE — ANESTHESIA POSTPROCEDURE EVALUATION
Anesthesia Post Evaluation    Patient: Ju Barraza    Procedure(s) Performed: Procedure(s) (LRB):  ULTRASOUND, PROSTATE, RECTAL APPROACH, WITH GOLD FIDUCIAL MARKER INSERTION (N/A)  -barrigel-TRANSPERINEAL INSERTION OF PERIPROSTATIC GEL (N/A)    Final Anesthesia Type: general      Patient location during evaluation: PACU  Patient participation: Yes- Able to Participate  Level of consciousness: awake and alert  Post-procedure vital signs: reviewed and stable  Pain management: adequate  Airway patency: patent    PONV status at discharge: No PONV  Anesthetic complications: no      Cardiovascular status: blood pressure returned to baseline  Respiratory status: unassisted  Hydration status: euvolemic  Follow-up not needed.              Vitals Value Taken Time   /79 04/29/25 12:40   Temp 36.7 °C (98.1 °F) 04/29/25 12:05   Pulse 70 04/29/25 12:40   Resp 16 04/29/25 12:40   SpO2 100 % 04/29/25 12:40         Event Time   Out of Recovery 12:11:00         Pain/Liban Score: Liban Score: 10 (4/29/2025 11:55 AM)  Modified Liban Score: 20 (4/29/2025  1:05 PM)

## 2025-06-30 ENCOUNTER — PATIENT MESSAGE (OUTPATIENT)
Dept: HEMATOLOGY/ONCOLOGY | Facility: CLINIC | Age: 70
End: 2025-06-30
Payer: MEDICARE

## 2025-06-30 ENCOUNTER — TELEPHONE (OUTPATIENT)
Dept: HEMATOLOGY/ONCOLOGY | Facility: CLINIC | Age: 70
End: 2025-06-30
Payer: MEDICARE

## 2025-06-30 NOTE — TELEPHONE ENCOUNTER
Called and LVM about upcoming appts and labs.  Sent portal message to pt regarding upcoming appt.

## 2025-07-04 ENCOUNTER — LAB VISIT (OUTPATIENT)
Dept: LAB | Facility: HOSPITAL | Age: 70
End: 2025-07-04
Attending: SURGERY
Payer: MEDICARE

## 2025-07-04 DIAGNOSIS — R97.20 ELEVATED PSA: ICD-10-CM

## 2025-07-04 DIAGNOSIS — C61 PROSTATE CANCER: ICD-10-CM

## 2025-07-04 LAB — PSA SERPL-MCNC: 0.17 NG/ML (ref ?–4)

## 2025-07-04 PROCEDURE — 84153 ASSAY OF PSA TOTAL: CPT

## 2025-07-04 PROCEDURE — 36415 COLL VENOUS BLD VENIPUNCTURE: CPT

## 2025-07-07 ENCOUNTER — TELEPHONE (OUTPATIENT)
Dept: HEMATOLOGY/ONCOLOGY | Facility: CLINIC | Age: 70
End: 2025-07-07
Payer: MEDICARE

## 2025-07-07 NOTE — TELEPHONE ENCOUNTER
Called pt in regards to appt that's scheduled with Dr.Khoobehi on tomorrow. Pt has labs that are currently due and need to get scheduled prior to appt. Office number was left on  for call back.

## 2025-07-07 NOTE — TELEPHONE ENCOUNTER
Spoke with pt to r/s appt d/t time conflict with provider appts with comp clinic pts. Pt confirmed.

## 2025-07-08 ENCOUNTER — OFFICE VISIT (OUTPATIENT)
Dept: HEMATOLOGY/ONCOLOGY | Facility: CLINIC | Age: 70
End: 2025-07-08
Payer: MEDICARE

## 2025-07-08 ENCOUNTER — LAB VISIT (OUTPATIENT)
Dept: LAB | Facility: HOSPITAL | Age: 70
End: 2025-07-08
Attending: INTERNAL MEDICINE
Payer: MEDICARE

## 2025-07-08 VITALS
BODY MASS INDEX: 31.67 KG/M2 | WEIGHT: 209 LBS | HEART RATE: 94 BPM | OXYGEN SATURATION: 97 % | RESPIRATION RATE: 16 BRPM | SYSTOLIC BLOOD PRESSURE: 184 MMHG | HEIGHT: 68 IN | DIASTOLIC BLOOD PRESSURE: 90 MMHG | TEMPERATURE: 98 F

## 2025-07-08 DIAGNOSIS — E61.1 IRON DEFICIENCY: ICD-10-CM

## 2025-07-08 DIAGNOSIS — C61 PROSTATE CANCER: Primary | ICD-10-CM

## 2025-07-08 DIAGNOSIS — C61 PROSTATE CANCER: ICD-10-CM

## 2025-07-08 LAB
ABSOLUTE EOSINOPHIL (SMH): 0.05 K/UL
ABSOLUTE MONOCYTE (SMH): 0.59 K/UL (ref 0.3–1)
ABSOLUTE NEUTROPHIL COUNT (SMH): 2.9 K/UL (ref 1.8–7.7)
ALBUMIN SERPL-MCNC: 4.2 G/DL (ref 3.5–5.2)
ALP SERPL-CCNC: 67 UNIT/L (ref 55–135)
ALT SERPL-CCNC: 22 UNIT/L (ref 10–44)
ANION GAP (SMH): 8 MMOL/L (ref 8–16)
AST SERPL-CCNC: 18 UNIT/L (ref 10–40)
BASOPHILS # BLD AUTO: 0.01 K/UL
BASOPHILS NFR BLD AUTO: 0.3 %
BILIRUB SERPL-MCNC: 0.4 MG/DL (ref 0.1–1)
BUN SERPL-MCNC: 12 MG/DL (ref 8–23)
CALCIUM SERPL-MCNC: 9.5 MG/DL (ref 8.7–10.5)
CHLORIDE SERPL-SCNC: 108 MMOL/L (ref 95–110)
CO2 SERPL-SCNC: 29 MMOL/L (ref 23–29)
CREAT SERPL-MCNC: 0.8 MG/DL (ref 0.5–1.4)
ERYTHROCYTE [DISTWIDTH] IN BLOOD BY AUTOMATED COUNT: 19 % (ref 11.5–14.5)
FERRITIN SERPL-MCNC: 66.5 NG/ML (ref 20–300)
GFR SERPLBLD CREATININE-BSD FMLA CKD-EPI: >60 ML/MIN/1.73/M2
GLUCOSE SERPL-MCNC: 110 MG/DL (ref 70–110)
HCT VFR BLD AUTO: 36.9 % (ref 40–54)
HGB BLD-MCNC: 11.5 GM/DL (ref 14–18)
IMM GRANULOCYTES # BLD AUTO: 0.02 K/UL (ref 0–0.04)
IMM GRANULOCYTES NFR BLD AUTO: 0.5 % (ref 0–0.5)
IRON SATN MFR SERPL: 19 % (ref 20–50)
IRON SERPL-MCNC: 73 UG/DL (ref 45–160)
LYMPHOCYTES # BLD AUTO: 0.43 K/UL (ref 1–4.8)
MCH RBC QN AUTO: 24.3 PG (ref 27–31)
MCHC RBC AUTO-ENTMCNC: 31.2 G/DL (ref 32–36)
MCV RBC AUTO: 78 FL (ref 82–98)
NUCLEATED RBC (/100WBC) (SMH): 0 /100 WBC
PLATELET # BLD AUTO: 192 K/UL (ref 150–450)
PMV BLD AUTO: 9.3 FL (ref 9.2–12.9)
POTASSIUM SERPL-SCNC: 3.2 MMOL/L (ref 3.5–5.1)
PROT SERPL-MCNC: 7.2 GM/DL (ref 6–8.4)
PSA SERPL-MCNC: 0.28 NG/ML (ref ?–4)
RBC # BLD AUTO: 4.74 M/UL (ref 4.6–6.2)
RELATIVE EOSINOPHIL (SMH): 1.3 % (ref 0–8)
RELATIVE LYMPHOCYTE (SMH): 10.8 % (ref 18–48)
RELATIVE MONOCYTE (SMH): 14.9 % (ref 4–15)
RELATIVE NEUTROPHIL (SMH): 72.2 % (ref 38–73)
SODIUM SERPL-SCNC: 145 MMOL/L (ref 136–145)
TIBC SERPL-MCNC: 379 UG/DL (ref 250–450)
TRANSFERRIN SERPL-MCNC: 271 MG/DL (ref 200–375)
WBC # BLD AUTO: 3.97 K/UL (ref 3.9–12.7)

## 2025-07-08 PROCEDURE — 99213 OFFICE O/P EST LOW 20 MIN: CPT | Mod: PBBFAC,PN | Performed by: INTERNAL MEDICINE

## 2025-07-08 PROCEDURE — 85025 COMPLETE CBC W/AUTO DIFF WBC: CPT

## 2025-07-08 PROCEDURE — 84466 ASSAY OF TRANSFERRIN: CPT

## 2025-07-08 PROCEDURE — 84153 ASSAY OF PSA TOTAL: CPT

## 2025-07-08 PROCEDURE — 82728 ASSAY OF FERRITIN: CPT

## 2025-07-08 PROCEDURE — 36415 COLL VENOUS BLD VENIPUNCTURE: CPT

## 2025-07-08 PROCEDURE — 99214 OFFICE O/P EST MOD 30 MIN: CPT | Mod: S$PBB,,, | Performed by: INTERNAL MEDICINE

## 2025-07-08 PROCEDURE — 99999 PR PBB SHADOW E&M-EST. PATIENT-LVL III: CPT | Mod: PBBFAC,,, | Performed by: INTERNAL MEDICINE

## 2025-07-08 PROCEDURE — 84295 ASSAY OF SERUM SODIUM: CPT

## 2025-07-08 NOTE — PROGRESS NOTES
Service Date:  7/8/25    Chief Complaint:  ProstateCa (3mofu)    Ju Barraza is a 69 y.o. male here with newly diagnosed prostate adenocarcinoma with a PSA as high as 251, Loren 4 + 4, with PSMA PET scan showing right inguinal and femoral lymph nodes with questionable involvement of portacaval lymph node and lung nodules which are not lighting up.      Currently on abiraterone.  Tolerating his Lupron.  Currently receiving radiation therapy.  No complaints to me.  Has some mood swings but he states that they do not feel unusual.    Review of Systems   Constitutional: Negative.  Negative for appetite change and unexpected weight change.   HENT: Negative.  Negative for mouth sores.    Eyes: Negative.  Negative for visual disturbance.   Respiratory: Negative.  Negative for cough.    Cardiovascular: Negative.  Negative for chest pain.   Gastrointestinal: Negative.  Negative for abdominal pain and diarrhea.   Endocrine: Negative.    Genitourinary: Negative.    Musculoskeletal: Negative.  Negative for back pain.   Integumentary:  Negative for rash. Negative.   Neurological: Negative.  Negative for headaches.   Hematological: Negative.  Negative for adenopathy.   Psychiatric/Behavioral: Negative.  The patient is not nervous/anxious.         Current Outpatient Medications   Medication Instructions    abiraterone (ZYTIGA) 1,000 mg, Oral, Daily    ciprofloxacin (CIPRO) 500 mg, 2 times daily    ferrous sulfate (FEOSOL) 325 mg, Oral, Daily    metFORMIN (GLUCOPHAGE) 500 mg, Oral, 2 times daily with meals    predniSONE (DELTASONE) 5 mg, Oral, Daily        Past Medical History:   Diagnosis Date    Borderline diabetes     Hypertension     Kidney stone     Pre-diabetes         Past Surgical History:   Procedure Laterality Date    COLONOSCOPY, SCREENING, HIGH RISK PATIENT N/A 12/23/2024    Procedure: COLONOSCOPY, SCREENING, HIGH RISK PATIENT;  Surgeon: Loco Orr DO;  Location: [facility];  Service: Endoscopy;   "Laterality: N/A;    TRANSPERINEAL INSERTION OF PERIPROSTATIC GEL N/A 4/29/2025    Procedure: -barrigel-TRANSPERINEAL INSERTION OF PERIPROSTATIC GEL;  Surgeon: Janeth Freire Jr., MD;  Location: [facility] OR;  Service: Urology;  Laterality: N/A;    TRANSRECTAL BIOPSY OF PROSTATE WITH ULTRASOUND GUIDANCE N/A 2/5/2025    Procedure: -uronav-BIOPSY, PROSTATE, RECTAL APPROACH, WITH US GUIDANCE (uploaded from [email or web address]);  Surgeon: Janeth Freire Jr., MD;  Location: [facility] OR;  Service: Urology;  Laterality: N/A;  Uronav prostate biopsy on 2/5/25.    TRANSRECTAL ULTRASOUND OF PROSTATE WITH INSERTION OF GOLD FIDUCIAL MARKER N/A 4/29/2025    Procedure: ULTRASOUND, PROSTATE, RECTAL APPROACH, WITH GOLD FIDUCIAL MARKER INSERTION;  Surgeon: Janeth Freire Jr., MD;  Location: [facility] OR;  Service: Urology;  Laterality: N/A;    XI ROBOTIC COLECTOMY, RIGHT N/A 2/21/2025    Procedure: XI ROBOTIC COLECTOMY, RIGHT;  Surgeon: Alexis Fuentes MD;  Location: [facility] OR;  Service: General;  Laterality: N/A;        Family History   Problem Relation Name Age of Onset    No Known Problems Father      No Known Problems Mother         Social History[1]      Vitals:    07/08/25 1348   BP: (!) 184/90   Pulse: 94   Resp: 16   Temp: 97.8 °F (36.6 °C)          Physical Exam:  BP (!) 184/90 (BP Location: Right arm, Patient Position: Sitting)   Pulse 94   Temp 97.8 °F (36.6 °C) (Temporal)   Resp 16   Ht 5' 8" (1.727 m)   Wt 94.8 kg (208 lb 15.9 oz)   SpO2 97%   BMI 31.78 kg/m²     Physical Exam  Vitals and nursing note reviewed.   Constitutional:       Appearance: Normal appearance.   HENT:      Head: Normocephalic and atraumatic.      Nose: Nose normal.      Mouth/Throat:      Mouth: Mucous membranes are moist.      Pharynx: Oropharynx is clear.   Eyes:      Extraocular Movements: Extraocular movements intact.      Conjunctiva/sclera: Conjunctivae normal.   Cardiovascular:      Rate and Rhythm: Normal rate and regular rhythm.      Heart sounds: " Normal heart sounds.   Pulmonary:      Effort: Pulmonary effort is normal.      Breath sounds: Normal breath sounds.   Abdominal:      General: Abdomen is flat. Bowel sounds are normal.      Palpations: Abdomen is soft.   Musculoskeletal:         General: Normal range of motion.      Cervical back: Normal range of motion and neck supple.   Skin:     General: Skin is warm and dry.   Neurological:      General: No focal deficit present.      Mental Status: He is alert and oriented to person, place, and time. Mental status is at baseline.   Psychiatric:         Mood and Affect: Mood normal.          Labs:  Lab Results   Component Value Date    WBC 6.46 04/28/2025    RBC 5.09 04/28/2025    HGB 11.7 (L) 04/28/2025    HCT 39.3 (L) 04/28/2025    MCV 77 (L) 04/28/2025    MCH 23.0 (L) 04/28/2025    MCHC 29.8 (L) 04/28/2025    RDW 17.8 (H) 04/28/2025     04/28/2025    MPV 10.1 04/28/2025    GRAN 9.2 (H) 02/22/2025    GRAN 84.0 (H) 02/22/2025    LYMPH 0.7 (L) 02/22/2025    LYMPH 6.5 (L) 02/22/2025    MONO 1.0 02/22/2025    MONO 8.9 02/22/2025    EOS 0.0 02/22/2025    BASO 0.01 02/22/2025    EOSINOPHIL 0.0 02/22/2025    BASOPHIL 0.1 02/22/2025     Sodium   Date Value Ref Range Status   04/28/2025 139 136 - 145 mmol/L Final     Potassium   Date Value Ref Range Status   04/28/2025 3.7 3.5 - 5.1 mmol/L Final     Chloride   Date Value Ref Range Status   04/28/2025 107 95 - 110 mmol/L Final     CO2   Date Value Ref Range Status   04/28/2025 24 23 - 29 mmol/L Final     Glucose   Date Value Ref Range Status   04/28/2025 208 (H) 70 - 110 mg/dL Final     BUN   Date Value Ref Range Status   04/28/2025 12 8 - 23 mg/dL Final     Creatinine   Date Value Ref Range Status   04/28/2025 0.9 0.5 - 1.4 mg/dL Final     Calcium   Date Value Ref Range Status   04/28/2025 8.4 (L) 8.7 - 10.5 mg/dL Final     Protein Total   Date Value Ref Range Status   04/01/2025 7.3 6.0 - 8.4 gm/dL Final     Albumin   Date Value Ref Range Status   04/01/2025  4.1 3.5 - 5.2 g/dL Final     Bilirubin Total   Date Value Ref Range Status   04/01/2025 0.3 0.1 - 1.0 mg/dL Final     Comment:     For infants and newborns, interpretation of results should be based   on gestational age, weight and in agreement with clinical   observations.    Premature Infant recommended reference ranges:   0-24 hours:  <8.0 mg/dL   24-48 hours: <12.0 mg/dL   3-5 days:    <15.0 mg/dL   6-29 days:   <15.0 mg/dL     ALP   Date Value Ref Range Status   04/01/2025 79 55 - 135 unit/L Final     AST   Date Value Ref Range Status   04/01/2025 12 10 - 40 unit/L Final     ALT   Date Value Ref Range Status   04/01/2025 15 10 - 44 unit/L Final     Anion Gap   Date Value Ref Range Status   04/28/2025 8 8 - 16 mmol/L Final       A/P:    Prostate adenocarcinoma   -Loren 4 + 4   -pretreatment    -on Lupron   -now on abiraterone and prednisone.  Tolerating both well.    -currently undergoing radiation therapy  -receiving Lupron with Urology  -RTC in 6 months    Iron deficiency   -due to blood loss from previous colon issues with a high-grade dysplasia   -on oral iron   -check labs today    Aurash Khoobehi, MD  Hematology and Oncology             [1]   Social History  Tobacco Use    Smoking status: Never     Passive exposure: Never    Smokeless tobacco: Never   Substance Use Topics    Alcohol use: Never    Drug use: Never

## 2025-07-15 DIAGNOSIS — C61 PROSTATE CANCER: Primary | ICD-10-CM

## 2025-07-15 RX ORDER — TAMSULOSIN HYDROCHLORIDE 0.4 MG/1
0.4 CAPSULE ORAL DAILY
Qty: 30 CAPSULE | Refills: 11 | Status: SHIPPED | OUTPATIENT
Start: 2025-07-15 | End: 2026-07-15

## 2025-07-16 ENCOUNTER — TREATMENT (OUTPATIENT)
Dept: RADIATION ONCOLOGY | Facility: CLINIC | Age: 70
End: 2025-07-16
Payer: MEDICARE

## 2025-07-16 PROCEDURE — G6015 RADIATION TX DELIVERY IMRT: HCPCS | Mod: S$GLB,,, | Performed by: RADIOLOGY

## 2025-07-16 PROCEDURE — 77336 RADIATION PHYSICS CONSULT: CPT | Mod: S$GLB,,, | Performed by: RADIOLOGY

## 2025-07-16 PROCEDURE — 77014 PR  CT GUIDANCE PLACEMENT RAD THERAPY FIELDS: CPT | Mod: S$GLB,,, | Performed by: RADIOLOGY

## 2025-08-05 ENCOUNTER — CLINICAL SUPPORT (OUTPATIENT)
Dept: RADIATION ONCOLOGY | Facility: CLINIC | Age: 70
End: 2025-08-05
Payer: MEDICARE

## 2025-08-05 DIAGNOSIS — C61 PROSTATE CANCER: Primary | ICD-10-CM

## 2025-08-05 PROCEDURE — 99499 UNLISTED E&M SERVICE: CPT | Mod: ,,, | Performed by: RADIOLOGY

## 2025-08-05 NOTE — PROGRESS NOTES
DIAGNOSIS: Regionally metastatic PCa     TREATMENT      Contacted patient today for 3 week checkup. Pt reports improvements/recovery of his enegry and LUTS/nocturia, and denies any pain, bleeding, or other issues/complaints.    A/P  1.  PSA q6m; continue ADT+Chelo/pred x2-3 years total  2.  Follow-up with Drs. Khoobehi and Tani as directed  3.  Return to clinic in 6 months.

## 2025-08-28 ENCOUNTER — TELEPHONE (OUTPATIENT)
Dept: UROLOGY | Facility: CLINIC | Age: 70
End: 2025-08-28
Payer: MEDICARE

## 2025-08-29 ENCOUNTER — OFFICE VISIT (OUTPATIENT)
Dept: UROLOGY | Facility: CLINIC | Age: 70
End: 2025-08-29
Payer: MEDICARE

## 2025-08-29 VITALS — WEIGHT: 203 LBS | BODY MASS INDEX: 30.77 KG/M2 | HEIGHT: 68 IN

## 2025-08-29 DIAGNOSIS — C61 PROSTATE CANCER: Primary | ICD-10-CM

## 2025-08-29 PROCEDURE — 99999 PR PBB SHADOW E&M-EST. PATIENT-LVL III: CPT | Mod: PBBFAC,,, | Performed by: UROLOGY

## 2025-08-29 PROCEDURE — 99213 OFFICE O/P EST LOW 20 MIN: CPT | Mod: PBBFAC,PO | Performed by: UROLOGY

## 2025-09-01 DIAGNOSIS — C61 PROSTATE CANCER: ICD-10-CM

## 2025-09-02 DIAGNOSIS — C61 PROSTATE CANCER: ICD-10-CM

## 2025-09-02 RX ORDER — PREDNISONE 5 MG/1
5 TABLET ORAL DAILY
Qty: 30 TABLET | Refills: 5 | Status: SHIPPED | OUTPATIENT
Start: 2025-09-02

## 2025-09-02 RX ORDER — ABIRATERONE ACETATE 250 MG/1
1000 TABLET ORAL DAILY
Qty: 120 TABLET | Refills: 5 | Status: ACTIVE | OUTPATIENT
Start: 2025-09-02 | End: 2026-09-02

## 2025-09-04 ENCOUNTER — TELEPHONE (OUTPATIENT)
Dept: HEMATOLOGY/ONCOLOGY | Facility: CLINIC | Age: 70
End: 2025-09-04
Payer: MEDICARE

## 2025-09-04 DIAGNOSIS — R06.02 SHORTNESS OF BREATH: Primary | ICD-10-CM

## 2025-09-05 ENCOUNTER — TELEPHONE (OUTPATIENT)
Facility: CLINIC | Age: 70
End: 2025-09-05
Payer: MEDICARE

## 2025-09-05 DIAGNOSIS — K63.89 COLONIC MASS: Primary | ICD-10-CM

## 2025-09-05 DIAGNOSIS — C61 PROSTATE CANCER: ICD-10-CM

## (undated) DEVICE — PACK CUSTOM UNIV BASIN SLI

## (undated) DEVICE — GUN BIOPSY 18GA MONOPLY

## (undated) DEVICE — TOWEL OR DISP STRL BLUE 4/PK

## (undated) DEVICE — STRAP OR TABLE 5IN X 72IN

## (undated) DEVICE — JELLY SURGILUBE LUBE TUBE 2OZ

## (undated) DEVICE — SUT VLOC 90 3-0 V-20 NDL 9

## (undated) DEVICE — TROCAR ENDOPATH XCEL 5X100MM

## (undated) DEVICE — RELOAD SUREFORM 60 3.5 BLU 6R

## (undated) DEVICE — CONTAINER SPECIMEN OR STER 4OZ

## (undated) DEVICE — COVER PROBE 3D/4D

## (undated) DEVICE — TRAY CATH 1-LYR URIMTR 16FR

## (undated) DEVICE — CARTRIDGE BABCOCK GRASPER 5X45

## (undated) DEVICE — SUT EASE CROSSBOW CLSR SYS

## (undated) DEVICE — GUIDE BIOPSY BIPLANAR 18G

## (undated) DEVICE — SYS SURGIPHOR STRL IRRG

## (undated) DEVICE — PACK SIRUS BASIC V SURG STRL

## (undated) DEVICE — DRAPE ARM DAVINCI XI

## (undated) DEVICE — KIT GELPORT LAPAROSCOPIC ABD

## (undated) DEVICE — LINER SUCTION 3000CC

## (undated) DEVICE — APPLICATOR CHLORAPREP ORN 26ML

## (undated) DEVICE — NDL INSUF ULTRA VERESS 120MM

## (undated) DEVICE — DRAPE MAJOR ABD 102X122X78IN

## (undated) DEVICE — SET TUBE PNEUMOCLEAR SE HI FLO

## (undated) DEVICE — CANNULA REDUCER 12-8MM

## (undated) DEVICE — GLOVE SENSICARE PI ALOE 7.5

## (undated) DEVICE — GLOVE SENSICARE PI ALOE 6.5

## (undated) DEVICE — SYS LABEL CORRECT MED

## (undated) DEVICE — OBTURATOR BLADELESS 8MM XI CLR

## (undated) DEVICE — COVER PROXIMA MAYO STAND

## (undated) DEVICE — DRESSING ABSRBNT ISLAND 3.6X8

## (undated) DEVICE — COVER TRANSDUCER LATEX N/STERI

## (undated) DEVICE — GOWN POLY REINF BRTH SLV XL

## (undated) DEVICE — COVER TIP CURVED SCISSORS XI

## (undated) DEVICE — SOL NACL IRR 1000ML BTL

## (undated) DEVICE — ELECTRODE REM PLYHSV RETURN 9

## (undated) DEVICE — GOWN POLY REINF X-LONG XL

## (undated) DEVICE — SOL IRRI STRL WATER 1000ML

## (undated) DEVICE — BLADE SURG CARBON STEEL SZ11

## (undated) DEVICE — GLOVE SENSICARE PI GRN 7

## (undated) DEVICE — SLEEVE SCD EXPRESS KNEE MEDIUM

## (undated) DEVICE — SYR 10CC LUER LOCK

## (undated) DEVICE — SUT MCRYL PLUS 4-0 PS2 27IN

## (undated) DEVICE — SUT 1 36IN PDS II

## (undated) DEVICE — SUT 0 VICRYL / CT-1

## (undated) DEVICE — SOL ELECTROLUBE ANTI-STIC

## (undated) DEVICE — CLIPPER BLADE MOD 4406 (CAREF)

## (undated) DEVICE — CLOSURE SKIN STERI STRIP 1/2X4

## (undated) DEVICE — DRAPE COLUMN DAVINCI XI

## (undated) DEVICE — CLIP HEMO-LOK MLX LARGE LF

## (undated) DEVICE — SPONGE BULKEE II ABSRB 6X6.75

## (undated) DEVICE — CANNULA SEAL 12MM

## (undated) DEVICE — RELOAD SUREFORM 60 2.5 WHT 6R

## (undated) DEVICE — SYR ONLY LUER LOCK 20CC

## (undated) DEVICE — GLOVE SENSICARE PI GRN 6.5

## (undated) DEVICE — Device

## (undated) DEVICE — SUT SA85H SILK 2-0

## (undated) DEVICE — SOL CLEARIFY VISUALIZATION LAP

## (undated) DEVICE — SEAL UNIVERSAL 5MM-8MM XI

## (undated) DEVICE — IRRIGATOR SUCTION W/TIP

## (undated) DEVICE — SEALER VESSEL DAVINCI XI

## (undated) DEVICE — PAD PINK TRENDELENBURG POS XL

## (undated) DEVICE — NDL SAFETY 21G X 1 1/2 ECLPSE

## (undated) DEVICE — DRAPE UINDERBUT GRAD PCH